# Patient Record
Sex: MALE | Race: WHITE | Employment: UNEMPLOYED | URBAN - METROPOLITAN AREA
[De-identification: names, ages, dates, MRNs, and addresses within clinical notes are randomized per-mention and may not be internally consistent; named-entity substitution may affect disease eponyms.]

---

## 2017-01-10 PROCEDURE — 82607 VITAMIN B-12: CPT | Performed by: INTERNAL MEDICINE

## 2017-02-15 RX ORDER — HYOSCYAMINE SULFATE 0.125 MG
0.12 TABLET,DISINTEGRATING ORAL EVERY 4 HOURS PRN
COMMUNITY
End: 2017-04-27

## 2017-02-17 ENCOUNTER — APPOINTMENT (OUTPATIENT)
Dept: LAB | Facility: HOSPITAL | Age: 52
End: 2017-02-17
Payer: COMMERCIAL

## 2017-02-17 DIAGNOSIS — R13.10 DYSPHAGIA: ICD-10-CM

## 2017-02-17 DIAGNOSIS — K22.0 ACHALASIA: ICD-10-CM

## 2017-02-17 LAB
ATRIAL RATE: 58 BPM
P AXIS: 119 DEGREES
P-R INTERVAL: 148 MS
Q-T INTERVAL: 412 MS
QRS DURATION: 104 MS
QTC CALCULATION (BEZET): 404 MS
R AXIS: 96 DEGREES
T AXIS: 109 DEGREES
VENTRICULAR RATE: 58 BPM

## 2017-02-17 PROCEDURE — 93005 ELECTROCARDIOGRAM TRACING: CPT

## 2017-02-17 PROCEDURE — 93010 ELECTROCARDIOGRAM REPORT: CPT | Performed by: INTERNAL MEDICINE

## 2017-02-20 ENCOUNTER — ANESTHESIA (OUTPATIENT)
Dept: ENDOSCOPY | Facility: HOSPITAL | Age: 52
End: 2017-02-20

## 2017-02-20 ENCOUNTER — HOSPITAL ENCOUNTER (OUTPATIENT)
Facility: HOSPITAL | Age: 52
Setting detail: HOSPITAL OUTPATIENT SURGERY
Discharge: HOME | End: 2017-02-20
Attending: INTERNAL MEDICINE | Admitting: INTERNAL MEDICINE
Payer: COMMERCIAL

## 2017-02-20 ENCOUNTER — ANESTHESIA EVENT (OUTPATIENT)
Dept: ENDOSCOPY | Facility: HOSPITAL | Age: 52
End: 2017-02-20

## 2017-02-20 ENCOUNTER — SURGERY (OUTPATIENT)
Age: 52
End: 2017-02-20

## 2017-02-20 VITALS
HEIGHT: 66 IN | SYSTOLIC BLOOD PRESSURE: 105 MMHG | BODY MASS INDEX: 20.09 KG/M2 | OXYGEN SATURATION: 100 % | DIASTOLIC BLOOD PRESSURE: 79 MMHG | TEMPERATURE: 98 F | WEIGHT: 125 LBS | HEART RATE: 64 BPM | RESPIRATION RATE: 18 BRPM

## 2017-02-20 DIAGNOSIS — K22.0 ACHALASIA: ICD-10-CM

## 2017-02-20 DIAGNOSIS — R13.10 DYSPHAGIA: Primary | ICD-10-CM

## 2017-02-20 DIAGNOSIS — R13.10 DYSPHAGIA, UNSPECIFIED TYPE: ICD-10-CM

## 2017-02-20 PROCEDURE — 0DJ08ZZ INSPECTION OF UPPER INTESTINAL TRACT, VIA NATURAL OR ARTIFICIAL OPENING ENDOSCOPIC: ICD-10-PCS | Performed by: INTERNAL MEDICINE

## 2017-02-20 PROCEDURE — 3E0G8GC INTRODUCTION OF OTHER THERAPEUTIC SUBSTANCE INTO UPPER GI, VIA NATURAL OR ARTIFICIAL OPENING ENDOSCOPIC: ICD-10-PCS | Performed by: INTERNAL MEDICINE

## 2017-02-20 RX ORDER — HYDROMORPHONE HYDROCHLORIDE 1 MG/ML
0.4 INJECTION, SOLUTION INTRAMUSCULAR; INTRAVENOUS; SUBCUTANEOUS EVERY 5 MIN PRN
Status: DISCONTINUED | OUTPATIENT
Start: 2017-02-20 | End: 2017-02-20

## 2017-02-20 RX ORDER — SODIUM CHLORIDE, SODIUM LACTATE, POTASSIUM CHLORIDE, CALCIUM CHLORIDE 600; 310; 30; 20 MG/100ML; MG/100ML; MG/100ML; MG/100ML
INJECTION, SOLUTION INTRAVENOUS CONTINUOUS
Status: DISCONTINUED | OUTPATIENT
Start: 2017-02-20 | End: 2017-02-20

## 2017-02-20 RX ORDER — ONDANSETRON 2 MG/ML
4 INJECTION INTRAMUSCULAR; INTRAVENOUS AS NEEDED
Status: DISCONTINUED | OUTPATIENT
Start: 2017-02-20 | End: 2017-02-20

## 2017-02-20 RX ORDER — METOCLOPRAMIDE HYDROCHLORIDE 5 MG/ML
10 INJECTION INTRAMUSCULAR; INTRAVENOUS AS NEEDED
Status: DISCONTINUED | OUTPATIENT
Start: 2017-02-20 | End: 2017-02-20

## 2017-02-20 RX ORDER — NALOXONE HYDROCHLORIDE 0.4 MG/ML
80 INJECTION, SOLUTION INTRAMUSCULAR; INTRAVENOUS; SUBCUTANEOUS AS NEEDED
Status: DISCONTINUED | OUTPATIENT
Start: 2017-02-20 | End: 2017-02-20

## 2017-02-20 NOTE — ANESTHESIA POSTPROCEDURE EVALUATION
[address] Patient Status:  Hospital Outpatient Surgery   Age/Gender 45year old male MRN [id number]   Location [facility]. Attending Jimenez Cheek MD   Hosp Day # 0 PCP Addis Catalan MD       Anesthesia Post-op

## 2017-02-20 NOTE — OPERATIVE REPORT
PATIENT NAME: Marquis De Leon  MRN: [id number]  DATE OF OPERATION: 2/20/17  REFERRING PHYSICIAN: Dr. Ailyn Alvarez  Medications: MAC  PREOPERATIVE DIAGNOSIS  Dysphagia  Achalasia  POSTOPERATIVE DIAGNOSIS:  1. Retained saliva and food particles in the esophagus  2. the procedure well. RECOMMENDATIONS   1. The patient will be provided with a written summary of today's endoscopic findings  2. The patient will be notified with biopsy results in 1 - 2 weeks. 3. Repeat egd with MAC with botox injection in 6 months.   A

## 2017-02-20 NOTE — H&P
H and P Update    The H and P by Dr. Heather Garvey, dated 2/15/17, has no changes.     GENERAL: well developed, well nourished, in no apparent distress  SKIN: no rashes, no suspicious lesions  HEENT: atraumatic, normocephalic, ears an

## 2017-02-20 NOTE — ANESTHESIA PREPROCEDURE EVALUATION
PRE-OP EVALUATION    Patient Name: Aj Cantrell    Pre-op Diagnosis: Achalasia [K22.0]  Dysphagia, unspecified type [R13.10]    Procedure(s):  ESOPHAGOGASTRODUODENOSCOPY WITH  INJECTION OF TWO HUNDRED UNITS BOTOX INTO ESOPHAGUS                   Surgeon Colon cancer: s/p colectomy. Cardiovascular        Exercise tolerance: good           (+) hypertension and well controlled                                    Endo/Other    Negative endo/other ROS.                               Pulmonary  [address] NDL/CATH SPI DX/THER NJX N/A 3/11/2015    Comment Procedure: LUMBAR EPIDURAL;  Surgeon: Paul Ortiz MD;  Location: [facility] FOR PAIN MANAGEMENT    INJECTION; SINGLE/MULTIPLE TRIGGER POINT(S), 1/2 MUS 01/10/2017   * 01/10/2017   CA 9.2 01/10/2017            Airway      Mallampati: II  Mouth opening: >3 FB  TM distance: > 6 cm  Neck ROM: full Cardiovascular      Rhythm: regular  Rate: normal  (-) murmur   Dental  Comment: Dentition is grossly [place]

## 2017-04-21 PROCEDURE — 86141 C-REACTIVE PROTEIN HS: CPT | Performed by: INTERNAL MEDICINE

## 2017-05-16 ENCOUNTER — APPOINTMENT (OUTPATIENT)
Dept: LAB | Age: 52
End: 2017-05-16
Attending: INTERNAL MEDICINE
Payer: COMMERCIAL

## 2017-05-16 DIAGNOSIS — G89.4 CHRONIC PAIN SYNDROME: ICD-10-CM

## 2017-05-16 DIAGNOSIS — R79.82 ELEVATED C-REACTIVE PROTEIN: ICD-10-CM

## 2017-05-16 PROCEDURE — 85652 RBC SED RATE AUTOMATED: CPT

## 2017-05-16 PROCEDURE — 36415 COLL VENOUS BLD VENIPUNCTURE: CPT

## 2017-05-16 PROCEDURE — 86140 C-REACTIVE PROTEIN: CPT

## 2017-05-31 PROBLEM — M51.36 DDD (DEGENERATIVE DISC DISEASE), LUMBAR: Status: ACTIVE | Noted: 2017-05-31

## 2017-06-15 PROBLEM — Z98.890 H/O LUMBOSACRAL SPINE SURGERY: Status: ACTIVE | Noted: 2017-06-15

## 2017-08-08 PROBLEM — M54.2 CERVICAL PAIN: Status: ACTIVE | Noted: 2017-08-08

## 2017-08-08 PROBLEM — G89.29 CHRONIC RIGHT-SIDED THORACIC BACK PAIN: Status: ACTIVE | Noted: 2017-08-08

## 2017-08-08 PROBLEM — M54.6 CHRONIC RIGHT-SIDED THORACIC BACK PAIN: Status: ACTIVE | Noted: 2017-08-08

## 2017-08-08 PROBLEM — M54.2 NECK PAIN: Status: ACTIVE | Noted: 2017-08-08

## 2017-08-30 PROBLEM — M48.02 FORAMINAL STENOSIS OF CERVICAL REGION: Status: ACTIVE | Noted: 2017-08-30

## 2017-10-14 ENCOUNTER — APPOINTMENT (OUTPATIENT)
Dept: LAB | Facility: HOSPITAL | Age: 52
End: 2017-10-14
Payer: COMMERCIAL

## 2017-10-14 DIAGNOSIS — R10.12 LEFT UPPER QUADRANT PAIN: ICD-10-CM

## 2017-10-14 PROCEDURE — 93005 ELECTROCARDIOGRAM TRACING: CPT

## 2017-10-14 PROCEDURE — 93010 ELECTROCARDIOGRAM REPORT: CPT | Performed by: INTERNAL MEDICINE

## 2017-10-26 ENCOUNTER — ANESTHESIA EVENT (OUTPATIENT)
Dept: ENDOSCOPY | Facility: HOSPITAL | Age: 52
End: 2017-10-26
Payer: COMMERCIAL

## 2017-10-27 ENCOUNTER — HOSPITAL ENCOUNTER (OUTPATIENT)
Facility: HOSPITAL | Age: 52
Setting detail: HOSPITAL OUTPATIENT SURGERY
Discharge: HOME | End: 2017-10-27
Attending: INTERNAL MEDICINE | Admitting: INTERNAL MEDICINE
Payer: COMMERCIAL

## 2017-10-27 ENCOUNTER — ANESTHESIA (OUTPATIENT)
Dept: ENDOSCOPY | Facility: HOSPITAL | Age: 52
End: 2017-10-27
Payer: COMMERCIAL

## 2017-10-27 ENCOUNTER — SURGERY (OUTPATIENT)
Age: 52
End: 2017-10-27

## 2017-10-27 VITALS
HEIGHT: 66 IN | TEMPERATURE: 98 F | OXYGEN SATURATION: 96 % | HEART RATE: 61 BPM | SYSTOLIC BLOOD PRESSURE: 107 MMHG | BODY MASS INDEX: 19.29 KG/M2 | DIASTOLIC BLOOD PRESSURE: 70 MMHG | RESPIRATION RATE: 16 BRPM | WEIGHT: 120 LBS

## 2017-10-27 DIAGNOSIS — R10.12 LEFT UPPER QUADRANT PAIN: Primary | ICD-10-CM

## 2017-10-27 PROCEDURE — 3E0G8GC INTRODUCTION OF OTHER THERAPEUTIC SUBSTANCE INTO UPPER GI, VIA NATURAL OR ARTIFICIAL OPENING ENDOSCOPIC: ICD-10-PCS | Performed by: INTERNAL MEDICINE

## 2017-10-27 RX ORDER — SODIUM CHLORIDE, SODIUM LACTATE, POTASSIUM CHLORIDE, CALCIUM CHLORIDE 600; 310; 30; 20 MG/100ML; MG/100ML; MG/100ML; MG/100ML
INJECTION, SOLUTION INTRAVENOUS CONTINUOUS
Status: DISCONTINUED | OUTPATIENT
Start: 2017-10-27 | End: 2017-10-27

## 2017-10-27 RX ORDER — DEXAMETHASONE SODIUM PHOSPHATE 4 MG/ML
4 VIAL (ML) INJECTION AS NEEDED
Status: DISCONTINUED | OUTPATIENT
Start: 2017-10-27 | End: 2017-10-27

## 2017-10-27 RX ORDER — ONDANSETRON 2 MG/ML
4 INJECTION INTRAMUSCULAR; INTRAVENOUS AS NEEDED
Status: DISCONTINUED | OUTPATIENT
Start: 2017-10-27 | End: 2017-10-27

## 2017-10-27 RX ORDER — NALOXONE HYDROCHLORIDE 0.4 MG/ML
80 INJECTION, SOLUTION INTRAMUSCULAR; INTRAVENOUS; SUBCUTANEOUS AS NEEDED
Status: DISCONTINUED | OUTPATIENT
Start: 2017-10-27 | End: 2017-10-27

## 2017-10-27 RX ORDER — HYDROMORPHONE HYDROCHLORIDE 1 MG/ML
0.4 INJECTION, SOLUTION INTRAMUSCULAR; INTRAVENOUS; SUBCUTANEOUS EVERY 5 MIN PRN
Status: DISCONTINUED | OUTPATIENT
Start: 2017-10-27 | End: 2017-10-27

## 2017-10-27 NOTE — ANESTHESIA POSTPROCEDURE EVALUATION
[address] Patient Status:  Hospital Outpatient Surgery   Age/Gender 45year old male MRN [id number]   Location 118 [facility]. Attending Fadi Bailey MD   Hosp Day # 0 PCP Jose C Ibarra MD       Anesthesia Post-op

## 2017-10-27 NOTE — ANESTHESIA PREPROCEDURE EVALUATION
PRE-OP EVALUATION    Patient Name: Danilo Keysster    Pre-op Diagnosis: Left upper quadrant pain [R10.12]    Procedure(s):  ESOPHAGOGASTRODUODENOSCOPY AND INJECTION OF 2000 INTERNATIONAL UNITS OF BOTOX IN ESOPHAGUS    Surgeon(s) and Role:     * Gigi Aranda allergies indicates no known allergies. Anesthesia Evaluation    Patient summary reviewed.     Anesthetic Complications  (-) history of anesthetic complications         GI/Hepatic/Renal  Comment: Achalasia s/p multiple EGD/botox inj MANAGEMENT  7/09: OTHER SURGICAL HISTORY      Comment: egd with botox injection into GE junction  No date: OTHER SURGICAL HISTORY      Comment: colectomy  6/2012: OTHER SURGICAL HISTORY      Comment: egd - botox injection   11/2012: OTHER SURGICAL HISTORY Airway      Mallampati: II    TM distance: 4 - 6 cm  Neck ROM: full Cardiovascular    Cardiovascular exam normal.  Rhythm: regular  Rate: normal  (-) murmur   Dental    No notable dental history.          Pulmonary    Pulmonary exam normal.  Breath so

## 2017-10-27 NOTE — H&P
Gastroenterology H and P  By Dr. Declan Doyle  CC: Dysphagia and achalasia    HPI:  Alexandria Lauren is a 45year old male. The pt has achalasia and recurrent dysphagia - has needed repeat botox injections every 6 - 9 months.       Allergy: No Known Allergie Diagnosis Date   • Achalasia 1997    achalasia - s/p heller myotomy with revision in 2004   • Anxiety state, unspecified    • Cataract     right eye   • Chronic pain syndrome    • Depressive disorder, not elsewhere classified    • Diverticulosis of colon PAIN MANAGEMENT  7/09: OTHER SURGICAL HISTORY      Comment: egd with botox injection into GE junction  No date: OTHER SURGICAL HISTORY      Comment: colectomy  6/2012: OTHER SURGICAL HISTORY      Comment: egd - botox injection   11/2012: OTHER SURGICAL HIS exertion  CARDIOVASCULAR: denies chest pain on exertion  GI: as above  : denies nocturia or changes in stream  MUSCULOSKELETAL: denies back pain  NEURO: denies headaches  PSYCHE: denies depression or anxiety  HEMATOLOGIC: denies hx of anemia  ENDOCRINE:

## 2017-10-27 NOTE — OPERATIVE REPORT
PATIENT NAME: Ирина Lazar  MRN: [id number]  DATE OF OPERATION: 10/27/2017  REFERRING PHYSICIAN: Dr. Daisy Pavon  Medications:  MAC  PREOPERATIVE DIAGNOSIS    Achalasia and dysphagia  Dysphagia  Achalasia  POSTOPERATIVE DIAGNOSIS:  1.  Retained saliva and food remarkable for normal mucosa. The entire examined duodenum was normal to the third portion. The gastroscope was removed from the patient. The procedure was completed. The patient tolerated the procedure well. RECOMMENDATIONS   1.   The patient will be pr

## 2018-06-25 PROBLEM — Z72.0 TOBACCO USE: Status: ACTIVE | Noted: 2018-06-25

## 2018-07-07 ENCOUNTER — HOSPITAL ENCOUNTER (OUTPATIENT)
Age: 53
Discharge: HOME | End: 2018-07-07
Payer: COMMERCIAL

## 2018-07-07 VITALS
DIASTOLIC BLOOD PRESSURE: 66 MMHG | SYSTOLIC BLOOD PRESSURE: 115 MMHG | HEART RATE: 65 BPM | TEMPERATURE: 99 F | OXYGEN SATURATION: 98 % | RESPIRATION RATE: 18 BRPM

## 2018-07-07 DIAGNOSIS — H10.31 ACUTE CONJUNCTIVITIS OF RIGHT EYE, UNSPECIFIED ACUTE CONJUNCTIVITIS TYPE: Primary | ICD-10-CM

## 2018-07-07 PROCEDURE — 99213 OFFICE O/P EST LOW 20 MIN: CPT

## 2018-07-07 PROCEDURE — 99203 OFFICE O/P NEW LOW 30 MIN: CPT

## 2018-07-07 RX ORDER — POLYMYXIN B SULFATE AND TRIMETHOPRIM 1; 10000 MG/ML; [USP'U]/ML
1 SOLUTION OPHTHALMIC EVERY 6 HOURS
Qty: 10 ML | Refills: 0 | Status: SHIPPED | OUTPATIENT
Start: 2018-07-07 | End: 2018-07-11

## 2018-07-07 NOTE — ED INITIAL ASSESSMENT (HPI)
Pt here with c/o pain/burning to right eye since this morning. He also reports blurry vision and redness.

## 2018-07-07 NOTE — ED PROVIDER NOTES
Patient Seen in: [facility]    History   Patient presents with:   Eye Visual Problem (opthalmic)    Stated Complaint: burning and pain in r eye    HPI    CHIEF COMPLAINT: Right eye irritation, burning    HISTORY OF PRESENT ILLNES generalized or localized, unspecified site     back and hands   • Perforation of small intestine ([address]    • Problems with swallowing     Achalasia   • Unspecified essential hypertension    • Visual impairment     reading glasses       Past Surgical History DOCUMENTED NOT TO HAVE EXPERIENCED ANY* N/A      Comment: Procedure: LUMBAR EPIDURAL;  Surgeon:                Marylee Parry, MD;  Location: [place] [address] MANAGEMENT  3/11/2015: PATIENT [facility] PREOPERATIVE ORDER FOR IV ANT* N/A no evidence of laceration or stye. The upper and lower lids were everted and there was no foreign body identified. Pupils are equal round and reactive to light and accomadation. No evidence of scleral icterus, subconjunctival hemorrhage or abrasion.  Rufina Amadael [id number]  [phone]    In 5 days  For reevaluation        Medications Prescribed:  Current Discharge Medication List    START taking these medications    Polymyxin B-Trimethoprim 74952-2.1 UNIT/ML-% Ophthalmic Solution  Place 1 drop into the right eye

## 2018-07-26 PROBLEM — M43.12 SPONDYLOLISTHESIS OF CERVICAL REGION: Status: ACTIVE | Noted: 2018-07-26

## 2018-09-26 ENCOUNTER — HOSPITAL ENCOUNTER (EMERGENCY)
Facility: HOSPITAL | Age: 53
Discharge: HOME | End: 2018-09-26
Attending: EMERGENCY MEDICINE
Payer: COMMERCIAL

## 2018-09-26 ENCOUNTER — APPOINTMENT (OUTPATIENT)
Dept: CT IMAGING | Facility: HOSPITAL | Age: 53
End: 2018-09-26
Attending: NURSE PRACTITIONER
Payer: COMMERCIAL

## 2018-09-26 VITALS
WEIGHT: 120 LBS | HEIGHT: 66 IN | OXYGEN SATURATION: 96 % | HEART RATE: 55 BPM | SYSTOLIC BLOOD PRESSURE: 110 MMHG | TEMPERATURE: 98 F | BODY MASS INDEX: 19.29 KG/M2 | RESPIRATION RATE: 18 BRPM | DIASTOLIC BLOOD PRESSURE: 71 MMHG

## 2018-09-26 DIAGNOSIS — K52.9 ACUTE COLITIS: Primary | ICD-10-CM

## 2018-09-26 PROCEDURE — 85025 COMPLETE CBC W/AUTO DIFF WBC: CPT | Performed by: NURSE PRACTITIONER

## 2018-09-26 PROCEDURE — 83690 ASSAY OF LIPASE: CPT | Performed by: NURSE PRACTITIONER

## 2018-09-26 PROCEDURE — 74177 CT ABD & PELVIS W/CONTRAST: CPT | Performed by: NURSE PRACTITIONER

## 2018-09-26 PROCEDURE — 96361 HYDRATE IV INFUSION ADD-ON: CPT

## 2018-09-26 PROCEDURE — 80053 COMPREHEN METABOLIC PANEL: CPT | Performed by: NURSE PRACTITIONER

## 2018-09-26 PROCEDURE — 81003 URINALYSIS AUTO W/O SCOPE: CPT | Performed by: NURSE PRACTITIONER

## 2018-09-26 PROCEDURE — 99284 EMERGENCY DEPT VISIT MOD MDM: CPT

## 2018-09-26 PROCEDURE — 96374 THER/PROPH/DIAG INJ IV PUSH: CPT

## 2018-09-26 RX ORDER — CIPROFLOXACIN 500 MG/1
500 TABLET, FILM COATED ORAL 2 TIMES DAILY
Qty: 14 TABLET | Refills: 0 | Status: SHIPPED | OUTPATIENT
Start: 2018-09-26 | End: 2018-10-03

## 2018-09-26 RX ORDER — METRONIDAZOLE 500 MG/1
500 TABLET ORAL 3 TIMES DAILY
Qty: 30 TABLET | Refills: 0 | Status: SHIPPED | OUTPATIENT
Start: 2018-09-26 | End: 2018-10-06

## 2018-09-26 RX ORDER — ONDANSETRON 4 MG/1
4 TABLET, ORALLY DISINTEGRATING ORAL EVERY 4 HOURS PRN
Qty: 20 TABLET | Refills: 0 | Status: SHIPPED | OUTPATIENT
Start: 2018-09-26 | End: 2018-10-03 | Stop reason: WASHOUT

## 2018-09-26 RX ORDER — HALOPERIDOL 5 MG/ML
5 INJECTION INTRAMUSCULAR ONCE
Status: COMPLETED | OUTPATIENT
Start: 2018-09-26 | End: 2018-09-26

## 2018-09-26 NOTE — ED INITIAL ASSESSMENT (HPI)
Patient arrives with c/o severe abdominal pain and spasms related to his achalasia. Patient states he has been unable to eat or drink, having dry heaving.

## 2018-09-27 NOTE — ED PROVIDER NOTES
Patient Seen in: [facility] Emergency Department    History   Patient presents with:  Abdomen/Flank Pain (GI/)    Stated Complaint: abd pain    57-year-old male presents today with complaints of abdominal cramping and nausea vomiting.   Has had dec Comment:  Performed by Paul Ortiz MD at [place]  8/10/2018: CERVICAL EPIDURAL; N/A      Comment:  Performed by Paul Ortiz MD at [place]  9/6/2017: CERVICAL EPIDURAL; N/ MANAGEMENT  10/31/2013: GASTROSCOPY; N/A      Comment:  Performed by Morro Rice MD at [facility] ENDOSCOPY  3/11/2015: INJECTION, W/WO CONTRAST, DX/THERAPEUTIC SUBSTANCE,   EPIDURAL/SUBARACHNOID; LUMBAR/SACRAL; N/A      Comment:  Procedure: LUMBAR EPIDURAL; PAIN MANAGEMENT  7/09: OTHER SURGICAL HISTORY      Comment:  egd with botox injection into GE junction  No date: OTHER SURGICAL HISTORY      Comment:  colectomy  6/2012: OTHER SURGICAL HISTORY      Comment:  egd - botox injection  11/2012: OTHER SURGICAL H HPI.  Constitutional and vital signs reviewed. All other systems reviewed and negative except as noted above.     Physical Exam     ED Triage Vitals [09/26/18 1838]   /73   Pulse 62   Resp 18   Temp 98.3 °F (36.8 °C)   Temp src Temporal   SpO2 10 Normal   CBC WITH DIFFERENTIAL WITH PLATELET    Narrative: The following orders were created for panel order CBC WITH DIFFERENTIAL WITH PLATELET.   Procedure                               Abnormality         Status                     --------- and descending colon. Small bowel loops show no evidence of obstruction.   Diffuse peritoneal calcifications are seen which can be seen in patients with previous peritonitis with some adjacent fluid within the pelvis, previous sigmoid anastomosis changes a next week. Discussed results and plan of care with patient who verbalized understanding and agree with plan of care.           Disposition and Plan     Clinical Impression:  Acute colitis  (primary encounter diagnosis)    Disposition:  Discharge  9/26/2018

## 2018-10-02 ENCOUNTER — APPOINTMENT (OUTPATIENT)
Dept: CT IMAGING | Facility: HOSPITAL | Age: 53
End: 2018-10-02
Attending: EMERGENCY MEDICINE
Payer: COMMERCIAL

## 2018-10-02 ENCOUNTER — HOSPITAL ENCOUNTER (EMERGENCY)
Facility: HOSPITAL | Age: 53
Discharge: HOME | End: 2018-10-02
Attending: EMERGENCY MEDICINE
Payer: COMMERCIAL

## 2018-10-02 VITALS
HEIGHT: 66 IN | RESPIRATION RATE: 14 BRPM | DIASTOLIC BLOOD PRESSURE: 62 MMHG | WEIGHT: 119.94 LBS | BODY MASS INDEX: 19.27 KG/M2 | HEART RATE: 69 BPM | OXYGEN SATURATION: 98 % | SYSTOLIC BLOOD PRESSURE: 102 MMHG | TEMPERATURE: 98 F

## 2018-10-02 DIAGNOSIS — R10.84 ABDOMINAL PAIN, GENERALIZED: Primary | ICD-10-CM

## 2018-10-02 PROCEDURE — 83690 ASSAY OF LIPASE: CPT | Performed by: EMERGENCY MEDICINE

## 2018-10-02 PROCEDURE — 96361 HYDRATE IV INFUSION ADD-ON: CPT | Performed by: EMERGENCY MEDICINE

## 2018-10-02 PROCEDURE — 99285 EMERGENCY DEPT VISIT HI MDM: CPT | Performed by: EMERGENCY MEDICINE

## 2018-10-02 PROCEDURE — 96374 THER/PROPH/DIAG INJ IV PUSH: CPT | Performed by: EMERGENCY MEDICINE

## 2018-10-02 PROCEDURE — 85025 COMPLETE CBC W/AUTO DIFF WBC: CPT | Performed by: EMERGENCY MEDICINE

## 2018-10-02 PROCEDURE — 80053 COMPREHEN METABOLIC PANEL: CPT | Performed by: EMERGENCY MEDICINE

## 2018-10-02 PROCEDURE — 96375 TX/PRO/DX INJ NEW DRUG ADDON: CPT | Performed by: EMERGENCY MEDICINE

## 2018-10-02 PROCEDURE — 74177 CT ABD & PELVIS W/CONTRAST: CPT | Performed by: EMERGENCY MEDICINE

## 2018-10-02 RX ORDER — MORPHINE SULFATE 4 MG/ML
4 INJECTION, SOLUTION INTRAMUSCULAR; INTRAVENOUS ONCE
Status: COMPLETED | OUTPATIENT
Start: 2018-10-02 | End: 2018-10-02

## 2018-10-02 RX ORDER — HALOPERIDOL 5 MG/ML
5 INJECTION INTRAMUSCULAR ONCE
Status: COMPLETED | OUTPATIENT
Start: 2018-10-02 | End: 2018-10-02

## 2018-10-02 RX ORDER — LORAZEPAM 2 MG/ML
1 INJECTION INTRAMUSCULAR ONCE
Status: COMPLETED | OUTPATIENT
Start: 2018-10-02 | End: 2018-10-02

## 2018-10-02 NOTE — ED PROVIDER NOTES
Patient Seen in: [facility] Emergency Department    History   Patient presents with:  Abdomen/Flank Pain (GI/)    Stated Complaint: abd pain    HPI    52-year-old with a history of achalasia status post Heller knee with revision in 2004 who is jonh History:  9/14/2018: CERVICAL EPIDURAL; N/A      Comment:  Performed by Susan Crespo MD at [place]  8/27/2018: CERVICAL EPIDURAL; N/A      Comment:  Performed by Susan Crespo MD at [address] [place] [address] FLUOR GID & Maricel Dates NDL/CATH SPI DX/THER NJX; N/A      Comment:  Procedure: LUMBAR EPIDURAL;  Surgeon: Miakel Chávez MD;  Location: [address] [place] [address] MANAGEMENT  10/31/2013: GASTROSCOPY; N/A      Comment:  Performed by Mery Gallardo, MANAGEMENT  3/11/2015: M-BOBBY BY ROSS BAJWA [facility] 5+ YR; N/A      Comment:  Procedure: LUMBAR EPIDURAL;  Surgeon: Clau Crowley MD;  Location: [address] [place] [address] MANAGEMENT  7/09: OTHER SURGICAL HISTORY      Comment:  egd with botox 7.0 times per week      Types: Cannabis      Comment: marijuana daily      Review of Systems    Positive for stated complaint: abd pain  Other systems are as noted in HPI. Constitutional and vital signs reviewed.       All other systems reviewed and negati Status                     ---------                               -----------         ------                     CBC W/ DIFFERENTIAL[[id number]]          Abnormal            Final result                 Please view results for these tests on the individua

## 2018-10-03 ENCOUNTER — HOSPITAL ENCOUNTER (OUTPATIENT)
Facility: HOSPITAL | Age: 53
Discharge: HOME | End: 2018-10-03
Attending: INTERNAL MEDICINE | Admitting: INTERNAL MEDICINE
Payer: COMMERCIAL

## 2018-10-03 VITALS
WEIGHT: 120 LBS | RESPIRATION RATE: 20 BRPM | SYSTOLIC BLOOD PRESSURE: 122 MMHG | DIASTOLIC BLOOD PRESSURE: 81 MMHG | HEIGHT: 66 IN | TEMPERATURE: 98 F | OXYGEN SATURATION: 91 % | BODY MASS INDEX: 19.29 KG/M2 | HEART RATE: 69 BPM

## 2018-10-03 DIAGNOSIS — R13.19 ESOPHAGEAL DYSPHAGIA: ICD-10-CM

## 2018-10-03 DIAGNOSIS — K22.0 ACHALASIA: ICD-10-CM

## 2018-10-03 PROCEDURE — 3E0G8GC INTRODUCTION OF OTHER THERAPEUTIC SUBSTANCE INTO UPPER GI, VIA NATURAL OR ARTIFICIAL OPENING ENDOSCOPIC: ICD-10-PCS | Performed by: INTERNAL MEDICINE

## 2018-10-03 PROCEDURE — 88305 TISSUE EXAM BY PATHOLOGIST: CPT | Performed by: INTERNAL MEDICINE

## 2018-10-03 PROCEDURE — 88321 CONSLTJ&REPRT SLD PREP ELSWR: CPT | Performed by: INTERNAL MEDICINE

## 2018-10-03 PROCEDURE — 0DB58ZX EXCISION OF ESOPHAGUS, VIA NATURAL OR ARTIFICIAL OPENING ENDOSCOPIC, DIAGNOSTIC: ICD-10-PCS | Performed by: INTERNAL MEDICINE

## 2018-10-03 RX ORDER — SODIUM CHLORIDE, SODIUM LACTATE, POTASSIUM CHLORIDE, CALCIUM CHLORIDE 600; 310; 30; 20 MG/100ML; MG/100ML; MG/100ML; MG/100ML
INJECTION, SOLUTION INTRAVENOUS CONTINUOUS
Status: DISCONTINUED | OUTPATIENT
Start: 2018-10-03 | End: 2018-10-03

## 2018-10-03 NOTE — BRIEF OP NOTE
Pre-Operative Diagnosis: Esophageal dysphagia [R13.10]  Achalasia [K22.0]     Post-Operative Diagnosis: esophageal mass and achalasia   Procedure Performed:   Procedure(s):  ESOPHAGOGASTRODUODENOSCOPY with injection of Botox ge junction and forcep biopsy

## 2018-10-04 NOTE — H&P
H and P Update     The history obtained by Dr. Allison Vang, dated 9/20/18, has no changes.     GENERAL: well developed, well nourished, in no apparent distress  HEENT: atraumatic, normocephalic, ears and throat are clear  NECK: supple,

## 2018-10-12 NOTE — OPERATIVE REPORT
PATIENT NAME: Donna Dubon  MRN: [id number]  DATE OF OPERATION: 10/3/18  REFERRING PHYSICIAN: Dr. Zach Tavarez  Medications:  MAC  PREOPERATIVE DIAGNOSIS             Achalasia and dysphagia  POSTOPERATIVE DIAGNOSIS:  1. 7 cm mass extending from 31 to 38 cm fr biopsy results. 4. Repeat egd with botox in 9 months.   Loc Trevizo MD, Gastroenterologist  Cc: Dr. Zach Tavarez

## 2018-10-29 PROBLEM — C15.8 MALIGNANT NEOPLASM OF OVERLAPPING SITES OF ESOPHAGUS (HCC): Status: ACTIVE | Noted: 2018-10-29

## 2018-11-01 PROBLEM — C15.9 SQUAMOUS CELL CARCINOMA OF ESOPHAGUS (HCC): Status: ACTIVE | Noted: 2018-11-01

## 2018-11-02 PROBLEM — C15.5 MALIGNANT NEOPLASM OF LOWER THIRD OF ESOPHAGUS (HCC): Status: ACTIVE | Noted: 2018-11-02

## 2018-11-05 ENCOUNTER — ANESTHESIA (OUTPATIENT)
Dept: ENDOSCOPY | Facility: HOSPITAL | Age: 53
End: 2018-11-05
Payer: COMMERCIAL

## 2018-11-05 ENCOUNTER — HOSPITAL ENCOUNTER (OUTPATIENT)
Facility: HOSPITAL | Age: 53
Setting detail: HOSPITAL OUTPATIENT SURGERY
Discharge: HOME | End: 2018-11-05
Attending: INTERNAL MEDICINE | Admitting: INTERNAL MEDICINE
Payer: COMMERCIAL

## 2018-11-05 ENCOUNTER — ANESTHESIA EVENT (OUTPATIENT)
Dept: ENDOSCOPY | Facility: HOSPITAL | Age: 53
End: 2018-11-05
Payer: COMMERCIAL

## 2018-11-05 VITALS
HEIGHT: 66 IN | TEMPERATURE: 97 F | WEIGHT: 111 LBS | OXYGEN SATURATION: 99 % | SYSTOLIC BLOOD PRESSURE: 103 MMHG | HEART RATE: 64 BPM | DIASTOLIC BLOOD PRESSURE: 63 MMHG | RESPIRATION RATE: 18 BRPM | BODY MASS INDEX: 17.84 KG/M2

## 2018-11-05 DIAGNOSIS — C15.9 MALIGNANT NEOPLASM OF ESOPHAGUS, UNSPECIFIED LOCATION (HCC): ICD-10-CM

## 2018-11-05 PROCEDURE — 88305 TISSUE EXAM BY PATHOLOGIST: CPT | Performed by: INTERNAL MEDICINE

## 2018-11-05 PROCEDURE — 88321 CONSLTJ&REPRT SLD PREP ELSWR: CPT | Performed by: INTERNAL MEDICINE

## 2018-11-05 PROCEDURE — 88173 CYTOPATH EVAL FNA REPORT: CPT | Performed by: INTERNAL MEDICINE

## 2018-11-05 PROCEDURE — 07D78ZX EXTRACTION OF THORAX LYMPHATIC, VIA NATURAL OR ARTIFICIAL OPENING ENDOSCOPIC, DIAGNOSTIC: ICD-10-PCS | Performed by: INTERNAL MEDICINE

## 2018-11-05 RX ORDER — SODIUM CHLORIDE, SODIUM LACTATE, POTASSIUM CHLORIDE, CALCIUM CHLORIDE 600; 310; 30; 20 MG/100ML; MG/100ML; MG/100ML; MG/100ML
INJECTION, SOLUTION INTRAVENOUS CONTINUOUS
Status: DISCONTINUED | OUTPATIENT
Start: 2018-11-05 | End: 2018-11-13

## 2018-11-05 NOTE — ANESTHESIA POSTPROCEDURE EVALUATION
[address] Patient Status:  Hospital Outpatient Surgery   Age/Gender 52year old male MRN [id number]   Location [facility]. Attending Олег Valdez MD   Hosp Day # 0 PCP Luiza Peterson MD       Anesthesia Post-op

## 2018-11-05 NOTE — OPERATIVE REPORT
OPERATIVE REPORT   PATIENT NAME: Isa Rao  MRN: [id number]  DATE OF OPERATION: 11/5/2018  PREOPERATIVE DIAGNOSIS:   1. 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   He was referred for locoregional staging by endoscopic ultras scope across it without difficulty. 2. Normal stomach throughout with no evidence of ulcers, masses or other abnormalities. Retroflexion revealed a normal cardia and fundus. The antrum was normal and the pylorus was patent.   3. Normal duodenum to the seco

## 2018-11-05 NOTE — ANESTHESIA PREPROCEDURE EVALUATION
PRE-OP EVALUATION    Patient Name: Zee Tsang    Pre-op Diagnosis: Malignant neoplasm of es[facility], unspecified location ([facility] [C15.9]    Procedure(s):  ENDOSCOPIC ULTRASOUND , ESOPHAGOGASTRODUODENOSCOPY  with possible fine needle aspiration      Surg Pulmonary                           Neuro/Psych        (+) anxiety                            Past Surgical History:   Procedure Laterality Date   • CERVICAL EPIDURAL N/A 9/14/2018    Performed by Stephany Arenas MD at [facility] FLUOR GID & Kathryn Velasco NDL/CATH SPI DX/THER NJX N/A 3/11/2015    Procedure: LUMBAR EPIDURAL;  Surgeon: Shane Mooney MD;  Location: [address] [place] [address]   • GASTROSCOPY N/A 10/31/2013    Performed by Alejandra Restrepo MD at [facility] ENDOSCOPY   • INJECT egd - botox injection   • OTHER SURGICAL HISTORY  11/2012    egd - botox injection   • OTHER SURGICAL HISTORY  3/5/2013    EGD - botox injection   • OTHER SURGICAL HISTORY N/A 6/23/2016    Procedure: ESOPHAGOGASTRODUODENOSCOPY (EGD);   Surgeon: Grecia Maldonado Airway      Mallampati: I  Mouth opening: >3 FB  TM distance: > 6 cm  Neck ROM: full Cardiovascular    Cardiovascular exam normal.  Rhythm: regular  Rate: normal     Dental    No notable dental history.          Pulmonary    Pulmonary exam normal

## 2018-12-03 ENCOUNTER — HOSPITAL ENCOUNTER (INPATIENT)
Facility: HOSPITAL | Age: 53
LOS: 2 days | Discharge: HOME | DRG: 372 | End: 2018-12-05
Attending: EMERGENCY MEDICINE | Admitting: INTERNAL MEDICINE
Payer: COMMERCIAL

## 2018-12-03 ENCOUNTER — APPOINTMENT (OUTPATIENT)
Dept: CT IMAGING | Facility: HOSPITAL | Age: 53
DRG: 372 | End: 2018-12-03
Attending: EMERGENCY MEDICINE
Payer: COMMERCIAL

## 2018-12-03 DIAGNOSIS — R11.2 INTRACTABLE VOMITING WITH NAUSEA, UNSPECIFIED VOMITING TYPE: ICD-10-CM

## 2018-12-03 DIAGNOSIS — E87.1 HYPONATREMIA: Primary | ICD-10-CM

## 2018-12-03 DIAGNOSIS — R52 INTRACTABLE PAIN: ICD-10-CM

## 2018-12-03 PROBLEM — R73.9 HYPERGLYCEMIA: Status: ACTIVE | Noted: 2018-12-03

## 2018-12-03 PROBLEM — D64.9 ANEMIA: Status: ACTIVE | Noted: 2018-12-03

## 2018-12-03 PROCEDURE — 74177 CT ABD & PELVIS W/CONTRAST: CPT | Performed by: EMERGENCY MEDICINE

## 2018-12-03 RX ORDER — PANTOPRAZOLE SODIUM 40 MG/1
40 TABLET, DELAYED RELEASE ORAL 2 TIMES DAILY
COMMUNITY
End: 2019-09-20

## 2018-12-03 RX ORDER — KETAMINE HYDROCHLORIDE 50 MG/ML
0.2 INJECTION, SOLUTION, CONCENTRATE INTRAMUSCULAR; INTRAVENOUS ONCE
Status: COMPLETED | OUTPATIENT
Start: 2018-12-03 | End: 2018-12-03

## 2018-12-03 RX ORDER — BUPRENORPHINE HYDROCHLORIDE 8 MG/1
8 TABLET SUBLINGUAL EVERY 8 HOURS PRN
Status: DISCONTINUED | OUTPATIENT
Start: 2018-12-03 | End: 2018-12-04

## 2018-12-03 RX ORDER — ONDANSETRON 2 MG/ML
4 INJECTION INTRAMUSCULAR; INTRAVENOUS ONCE
Status: COMPLETED | OUTPATIENT
Start: 2018-12-03 | End: 2018-12-03

## 2018-12-03 RX ORDER — FLUTICASONE PROPIONATE 50 MCG
2 SPRAY, SUSPENSION (ML) NASAL DAILY
Status: DISCONTINUED | OUTPATIENT
Start: 2018-12-03 | End: 2018-12-05

## 2018-12-03 RX ORDER — DICYCLOMINE HYDROCHLORIDE 10 MG/1
10 CAPSULE ORAL EVERY 4 HOURS PRN
Status: DISCONTINUED | OUTPATIENT
Start: 2018-12-03 | End: 2018-12-05

## 2018-12-03 RX ORDER — CLOBETASOL PROPIONATE 0.5 MG/G
1 OINTMENT TOPICAL DAILY PRN
Refills: 4 | COMMUNITY
Start: 2018-11-15 | End: 2019-09-20

## 2018-12-03 RX ORDER — CLONAZEPAM 0.5 MG/1
0.5 TABLET ORAL 2 TIMES DAILY
Status: DISCONTINUED | OUTPATIENT
Start: 2018-12-03 | End: 2018-12-05

## 2018-12-03 RX ORDER — SODIUM CHLORIDE 9 MG/ML
INJECTION, SOLUTION INTRAVENOUS CONTINUOUS
Status: DISCONTINUED | OUTPATIENT
Start: 2018-12-03 | End: 2018-12-05

## 2018-12-03 RX ORDER — POTASSIUM CHLORIDE 14.9 MG/ML
20 INJECTION INTRAVENOUS ONCE
Status: DISCONTINUED | OUTPATIENT
Start: 2018-12-03 | End: 2018-12-03

## 2018-12-03 RX ORDER — MORPHINE SULFATE 4 MG/ML
1 INJECTION, SOLUTION INTRAMUSCULAR; INTRAVENOUS EVERY 2 HOUR PRN
Status: DISCONTINUED | OUTPATIENT
Start: 2018-12-03 | End: 2018-12-03

## 2018-12-03 RX ORDER — METOCLOPRAMIDE HYDROCHLORIDE 5 MG/ML
10 INJECTION INTRAMUSCULAR; INTRAVENOUS EVERY 8 HOURS PRN
Status: DISCONTINUED | OUTPATIENT
Start: 2018-12-03 | End: 2018-12-05

## 2018-12-03 RX ORDER — MORPHINE SULFATE 4 MG/ML
2 INJECTION, SOLUTION INTRAMUSCULAR; INTRAVENOUS EVERY 2 HOUR PRN
Status: CANCELLED | OUTPATIENT
Start: 2018-12-03

## 2018-12-03 RX ORDER — HYDROMORPHONE HYDROCHLORIDE 1 MG/ML
0.5 INJECTION, SOLUTION INTRAMUSCULAR; INTRAVENOUS; SUBCUTANEOUS EVERY 30 MIN PRN
Status: CANCELLED | OUTPATIENT
Start: 2018-12-03 | End: 2018-12-03

## 2018-12-03 RX ORDER — ACETAMINOPHEN 325 MG/1
650 TABLET ORAL EVERY 4 HOURS PRN
Status: DISCONTINUED | OUTPATIENT
Start: 2018-12-03 | End: 2018-12-05

## 2018-12-03 RX ORDER — CALCIPOTRIENE 50 UG/G
1 CREAM TOPICAL DAILY PRN
Refills: 4 | COMMUNITY
Start: 2018-11-15 | End: 2019-09-20

## 2018-12-03 RX ORDER — MORPHINE SULFATE 4 MG/ML
2 INJECTION, SOLUTION INTRAMUSCULAR; INTRAVENOUS EVERY 2 HOUR PRN
Status: DISCONTINUED | OUTPATIENT
Start: 2018-12-03 | End: 2018-12-03

## 2018-12-03 RX ORDER — SODIUM CHLORIDE 9 MG/ML
INJECTION, SOLUTION INTRAVENOUS CONTINUOUS
Status: CANCELLED | OUTPATIENT
Start: 2018-12-03 | End: 2018-12-03

## 2018-12-03 RX ORDER — HYDROCODONE BITARTRATE AND ACETAMINOPHEN 5; 325 MG/1; MG/1
2 TABLET ORAL EVERY 4 HOURS PRN
Status: DISCONTINUED | OUTPATIENT
Start: 2018-12-03 | End: 2018-12-03

## 2018-12-03 RX ORDER — ONDANSETRON 2 MG/ML
4 INJECTION INTRAMUSCULAR; INTRAVENOUS EVERY 6 HOURS PRN
Status: DISCONTINUED | OUTPATIENT
Start: 2018-12-03 | End: 2018-12-05

## 2018-12-03 RX ORDER — PREGABALIN 100 MG/1
100 CAPSULE ORAL 2 TIMES DAILY
Status: DISCONTINUED | OUTPATIENT
Start: 2018-12-03 | End: 2018-12-05

## 2018-12-03 RX ORDER — HYOSCYAMINE SULFATE 0.125 MG
0.12 TABLET,DISINTEGRATING ORAL EVERY 4 HOURS PRN
Status: DISCONTINUED | OUTPATIENT
Start: 2018-12-03 | End: 2018-12-05

## 2018-12-03 RX ORDER — DOCUSATE SODIUM 100 MG/1
100 CAPSULE, LIQUID FILLED ORAL 2 TIMES DAILY
Status: DISCONTINUED | OUTPATIENT
Start: 2018-12-03 | End: 2018-12-05

## 2018-12-03 RX ORDER — BISACODYL 10 MG
10 SUPPOSITORY, RECTAL RECTAL
Status: DISCONTINUED | OUTPATIENT
Start: 2018-12-03 | End: 2018-12-05

## 2018-12-03 RX ORDER — POLYETHYLENE GLYCOL 3350 17 G/17G
17 POWDER, FOR SOLUTION ORAL DAILY PRN
Status: DISCONTINUED | OUTPATIENT
Start: 2018-12-03 | End: 2018-12-05

## 2018-12-03 RX ORDER — FLUTICASONE PROPIONATE 50 MCG
2 SPRAY, SUSPENSION (ML) NASAL DAILY
COMMUNITY
End: 2019-01-25

## 2018-12-03 RX ORDER — MORPHINE SULFATE 4 MG/ML
4 INJECTION, SOLUTION INTRAMUSCULAR; INTRAVENOUS EVERY 2 HOUR PRN
Status: CANCELLED | OUTPATIENT
Start: 2018-12-03

## 2018-12-03 RX ORDER — MORPHINE SULFATE 4 MG/ML
4 INJECTION, SOLUTION INTRAMUSCULAR; INTRAVENOUS EVERY 2 HOUR PRN
Status: DISCONTINUED | OUTPATIENT
Start: 2018-12-03 | End: 2018-12-03

## 2018-12-03 RX ORDER — HALOPERIDOL 5 MG/ML
5 INJECTION INTRAMUSCULAR ONCE
Status: COMPLETED | OUTPATIENT
Start: 2018-12-03 | End: 2018-12-03

## 2018-12-03 RX ORDER — SODIUM PHOSPHATE, DIBASIC AND SODIUM PHOSPHATE, MONOBASIC 7; 19 G/133ML; G/133ML
1 ENEMA RECTAL ONCE AS NEEDED
Status: DISCONTINUED | OUTPATIENT
Start: 2018-12-03 | End: 2018-12-05

## 2018-12-03 RX ORDER — PANTOPRAZOLE SODIUM 40 MG/1
40 TABLET, DELAYED RELEASE ORAL 2 TIMES DAILY
Status: DISCONTINUED | OUTPATIENT
Start: 2018-12-03 | End: 2018-12-05

## 2018-12-03 RX ORDER — ACETAMINOPHEN 325 MG/1
650 TABLET ORAL EVERY 6 HOURS PRN
Status: DISCONTINUED | OUTPATIENT
Start: 2018-12-03 | End: 2018-12-05

## 2018-12-03 RX ORDER — ENOXAPARIN SODIUM 100 MG/ML
40 INJECTION SUBCUTANEOUS NIGHTLY
Status: DISCONTINUED | OUTPATIENT
Start: 2018-12-03 | End: 2018-12-05

## 2018-12-03 RX ORDER — RISPERIDONE 120 MG
1 KIT SUBCUTANEOUS 2 TIMES DAILY
Refills: 0 | COMMUNITY
Start: 2018-11-12 | End: 2018-12-06

## 2018-12-03 RX ORDER — HYDROCODONE BITARTRATE AND ACETAMINOPHEN 5; 325 MG/1; MG/1
1 TABLET ORAL EVERY 4 HOURS PRN
Status: DISCONTINUED | OUTPATIENT
Start: 2018-12-03 | End: 2018-12-03

## 2018-12-03 RX ORDER — LIDOCAINE 50 MG/G
1 OINTMENT TOPICAL DAILY PRN
Refills: 4 | COMMUNITY
Start: 2018-11-15 | End: 2019-02-18

## 2018-12-03 RX ORDER — MORPHINE SULFATE 4 MG/ML
4 INJECTION, SOLUTION INTRAMUSCULAR; INTRAVENOUS EVERY 30 MIN PRN
Status: DISCONTINUED | OUTPATIENT
Start: 2018-12-03 | End: 2018-12-03

## 2018-12-03 RX ORDER — ONDANSETRON 2 MG/ML
4 INJECTION INTRAMUSCULAR; INTRAVENOUS EVERY 4 HOURS PRN
Status: CANCELLED | OUTPATIENT
Start: 2018-12-03

## 2018-12-03 RX ORDER — POTASSIUM CHLORIDE 20 MEQ/1
40 TABLET, EXTENDED RELEASE ORAL ONCE
Status: COMPLETED | OUTPATIENT
Start: 2018-12-03 | End: 2018-12-03

## 2018-12-03 NOTE — ED NOTES
Patient remains updated with plan of care, waiting for transport to take him to room 406. Report has been called to [facility] for 406.

## 2018-12-03 NOTE — ED NOTES
Patient resting more comfortably on cart at this time, able to sleep intermittently. Belongings placed in bags by family member at bedside. Patient opens eyes to name and answers questions appropriately. VSS at this time. No distress observed.  Remains awar

## 2018-12-03 NOTE — ED NOTES
Patient requesting additional pain medication. MD informed. Aware of patient's BP, plan to order dose of ketamine as alternative to morphine.

## 2018-12-03 NOTE — ED NOTES
Attempted to give potassium. Patient started to cough following ingestion of first pill. Not safe to attempt ingestion of second pill as patient can not tolerate.

## 2018-12-03 NOTE — PROGRESS NOTES
NURSING ADMISSION NOTE      Patient admitted via Cart  Oriented to room. Safety precautions initiated. Bed in low position. Call light in reach.   Alert and oriented;stated i'm in pain;geven morphine 2 mg et time;after given he asked me if a gave it

## 2018-12-03 NOTE — ED NOTES
MD made aware of blood pressure and also that patient is refusing IV potassium, stating he is \"hooked up to too many things. \" Patient requesting PO potassium. Fluid bolus and PO potassium ordered.

## 2018-12-03 NOTE — ED NOTES
Received report from Ashia. Taking over patient care at this time. Patient sleeping on cot. No needs expressed. No apparent distress. Family is at bedside.

## 2018-12-03 NOTE — ED PROVIDER NOTES
Patient Seen in: [facility] Emergency Department    History   Patient presents with:  Nausea/Vomiting/Diarrhea (gastrointestinal)    Stated Complaint: abd pain, n/v/d    HPI    75-year-old with a history of achalasia, chronic back pain, depression, reading glasses       Past Surgical History:   Procedure Laterality Date   • CERVICAL EPIDURAL N/A 9/14/2018    Performed by Marylee Parry, MD at [address] N/A 8/27/2018    Performed by Marylee Parry, MD 10/31/2013    Performed by Matt Maciel MD at [address] [facility] N/A 11/13/2018    Performed by Dante Javed MD at [facility] N/A 6/4/2018    Performed by Tori Rasheed MD at [facility]) Smokeless tobacco: Never Used    Alcohol use: No    Drug use: Yes      Frequency: 7.0 times per week      Types: Cannabis      Comment: marijuana daily      Review of Systems    Positive for stated complaint: abd pain, n/v/d  Other systems are as noted in Abnormality         Status                     ---------                               -----------         ------                     CBC W/ DIFFERENTIAL[[id number]]          Abnormal            Final result                 Please v

## 2018-12-03 NOTE — ED NOTES
Patient taken to CT at this time by [facility]. Patient and family member remain updated with plan of care. Reports pain controlled at this time. CT screening form completed.

## 2018-12-03 NOTE — H&P
[facility] Hospitalist History and Physical      Patient presents with:  Nausea/Vomiting/Diarrhea (gastrointestinal)       PCP: Maria Isabel Tavares MD      History of Present Illness: Patient is a 52year old male with PMH sig for invasive SCC of espophagus on freddy FOR PAIN MANAGEMENT   • COLECTOMY  1990   • COLONOSCOPY  2004    normal sigmoid anastamosis   • COLONOSCOPY  11/2012    diverticulosis and sigmoid anastomosis   • ENDOSCOPIC ULTRASOUND (EUS) N/A 11/5/2018    Performed by Jessica Dan MD at [facility] ENDOSCOPY LUMBAR EPIDURAL N/A 6/15/2017    Performed by Doug Gold MD at [address] [place] [address] N/A 5/31/2017    Performed by Doug Gold MD at [address] [place] [address] N/A 5/16/2017    Performed by Sublingual SL Tab One sublingual q 8 hours prn pain   ClonazePAM 0.5 MG Oral Tab Take 1 tablet (0.5 mg total) by mouth 2 (two) times daily. pregabalin (LYRICA) 100 MG Oral Cap Take 1 capsule (100 mg total) by mouth 2 (two) times daily.    Lisinopril-Hydro No drainage.    Throat: Lips, mucosa, and tongue normal. Teeth and gums normal.   Neck: Supple, symmetrical, trachea midline, no cervical or supraclavicular lymph adenopathy, thyroid: no enlargment/tenderness/nodules appreciated   Lungs:   Clear to ausculta INDICATION: Esophageal cancer. COMPARISON: Whole-body PET CT 11/3/2018. TECHNIQUE: 2.5 mm thick unenhanced images were obtained through the chest. FINDINGS: The study is limited by its noncontrast nature.  The soft tissue mass in the mid and distal esophagu bifurcation. High-grade stenosis is noted involving the distal right common iliac artery with near complete occlusion. Moderate narrowing is noted on the left involving the distal left common iliac artery. RETROPERITONEUM:  No mass or adenopathy.   Slight esophagus  - onc consulted here    Ho achalasia  - swallowing ok    HTN  - hold meds for now    Hyponatremia  - from dehydration, IVF    Chronic anemia  - Hg near baseline    Lovenox    Outpatient records or previous hospital records reviewed.    DM hospit

## 2018-12-04 PROCEDURE — 90792 PSYCH DIAG EVAL W/MED SRVCS: CPT | Performed by: OTHER

## 2018-12-04 RX ORDER — MIRTAZAPINE 15 MG/1
15 TABLET, FILM COATED ORAL NIGHTLY
Status: DISCONTINUED | OUTPATIENT
Start: 2018-12-04 | End: 2018-12-05

## 2018-12-04 RX ORDER — SUCRALFATE ORAL 1 G/10ML
1 SUSPENSION ORAL
Status: DISCONTINUED | OUTPATIENT
Start: 2018-12-04 | End: 2018-12-05

## 2018-12-04 RX ORDER — BUPRENORPHINE HYDROCHLORIDE 8 MG/1
8 TABLET SUBLINGUAL ONCE
Status: COMPLETED | OUTPATIENT
Start: 2018-12-04 | End: 2018-12-04

## 2018-12-04 RX ORDER — BUPRENORPHINE HYDROCHLORIDE 8 MG/1
8 TABLET SUBLINGUAL EVERY 8 HOURS
Status: DISCONTINUED | OUTPATIENT
Start: 2018-12-04 | End: 2018-12-04

## 2018-12-04 RX ORDER — BUPRENORPHINE HYDROCHLORIDE 8 MG/1
8 TABLET SUBLINGUAL 3 TIMES DAILY
Status: DISCONTINUED | OUTPATIENT
Start: 2018-12-04 | End: 2018-12-05

## 2018-12-04 NOTE — PROGRESS NOTES
[facility] Hospitalist Progress Note                                                                   [address]  1/30/1965    SUBJECTIVE:    No diarrhea since last night.  + c dif     Able t HYDROcodone-acetaminophen, ondansetron HCl, Metoclopramide HCl, buprenorphine HCl, Dicyclomine HCl, hyoscyamine sulfate    Assessment/Plan:  Principal Problem:    Hyponatremia  Active Problems:    Anemia    Hyperglycemia    Intractable pain    Intractable

## 2018-12-04 NOTE — CONSULTS
[facility]  Report of Consultation    Dennise Mathew Patient Status:  Inpatient    1965 MRN [id number]   [facility] 4NW-A Attending Smitha Avila MD   Hosp Day # 1 PCP Clarence Mchugh MD     REASON FOR CONSULTATION: [address] [facility]   • CERVICAL EPIDURAL N/A 9/6/2017    Performed by Rachelle Vasquez MD at [facility]   • COLONOSCOPY  2004    normal sigmoid anastamosis   • COLONOSCOPY  11/2012    diverticulosis and MANAGEMENT   • LUMBAR EPIDURAL N/A 4/11/2018    Performed by Avery Guerrier MD at [address] [place] [address] N/A 6/15/2017    Performed by Avery Guerrier MD at [address] [place] [address] N/A 5/31/2017 Allergies    Medications:    Current Facility-Administered Medications:   •  Vancomycin HCl (FIRVANQ) 50 MG/ML oral solution 125 mg, 125 mg, Oral, QID  •  acetaminophen (TYLENOL) tab 650 mg, 650 mg, Oral, Q6H PRN  •  docusate sodium (COLACE) cap 100 mg, 10 and oriented x 3, not in acute distress. Weak    Vital Signs: /84 (BP Location: Right arm)   Pulse 64   Temp 98.1 °F (36.7 °C) (Oral)   Resp 16   SpO2 100%    HEENT: No Icterus. Oropharynx is dry   Neck:  No palpable lymphadenopathy. Neck is supple. 0.30 x10(3) uL    Basophil Absolute 0.02 0.00 - 0.10 x10(3) uL    Immature Granulocyte Absolute 0.03 0.00 - 1.00 x10(3) uL    Neutrophil % 66.7 %    Lymphocyte % 17.8 %    Monocyte % 12.4 %    Eosinophil % 1.8 %    Basophil % 0.5 %    Immature Granulocyte Hyponatremia     Intractable pain     Intractable vomiting with nausea, unspecified vomiting type        ASSESSMENT AND PLAN:     Meghan Valdes is a 52year old male with    1.  Cancer of the esophagus  Locally advanced disease - on Chemo RT  No treatment

## 2018-12-04 NOTE — PAYOR COMM NOTE
--------------  ADMISSION REVIEW     Payor: [address] PPO  Subscriber #:  [id number]  Authorization Number: [id number]    Admit date: 12/3/18  Admit time: 1406       Admitting Physician: Venice Biggs MD  Attending Physician:  Ebenezer Villasenor right eye   • Chronic pain syndrome    • Depression    • Diverticulosis of colon    • Diverticulosis of colon (without mention of hemorrhage)    • Essential hypertension, benign 8/17/2007   • High blood pressure    • Irritable bowel syndrome    • KIDNEY ST 1/21/2016    Performed by Mery Gallardo MD at [facility] ENDOSCOPY   • ESOPHAGOGASTRODUODENOSCOPY (EGD) N/A 5/27/2015    Performed by Mery Gallardo MD at [facility] ENDOSCOPY   • ESOPHAGOGASTRODUODENOSCOPY (EGD) N/A 12/19/2014    Performed by Mery Gallardo MD at SURGICAL HISTORY  6/2012    egd - botox injection   • OTHER SURGICAL HISTORY  11/2012    egd - botox injection   • OTHER SURGICAL HISTORY  3/5/2013    EGD - botox injection   • SPECIAL SERVICE OR REPORT      colon resection   • SPECIAL SERVICE OR REPORT following components:       Result Value    Glucose 113 (*)     Sodium 130 (*)     Potassium 3.4 (*)     Chloride 94 (*)     BUN 7 (*)     BUN/CREA Ratio 9.3 (*)     Calculated Osmolality 269 (*)     A/G Ratio 0.9 (*)     All other components within normal Impression:  Hyponatremia  (primary encounter diagnosis)  Intractable pain  Intractable vomiting with nausea, unspecified vomiting type    Disposition:  Admit  12/3/2018 11:13 am    Follow-up:  No follow-up provider specified.       Medications Prescribed: weakness     right leg   • Osteoarthritis of hand, left    • Osteoarthritis of hand, right    • Osteoarthritis of lumbar spine    • Osteoarthrosis, unspecified whether generalized or localized, unspecified site     back and hands   • Perforation of small i ESOPHAGOGASTRODUODENOSCOPY (EGD) N/A 8/5/2014    Performed by Quan Richmond MD at [facility] ENDOSCOPY   • ESOPHAGOGASTRODUODENOSCOPY (EGD) N/A 4/2/2014    Performed by Quan Richmond MD at [facility] ENDOSCOPY   • ESOPHAGOGASTRODUODENOSCOPY, POSSIBLE BIOPSY, POSSIBL • TONSILLECTOMY     • UPPER GI ENDOSCOPY,DIAGNOSIS  6/23/16    botox injection into the lower esophagus to treat achalasia;  Edward        ALL:  No Known Allergies       Prior to Admission Medications:  Pantoprazole Sodium 40 MG Oral Tab EC Take 40 mg by 1.00        Years: 25.00        Pack years: 25        Types: Cigarettes        Quit date: 10/17/2018        Years since quittin.1      Smokeless tobacco: Never Used    Alcohol use: No       Fam Hx  Family History   Problem Relation Age of Onset   • Austin BILT 0.4 12/03/2018    TP 6.6 12/03/2018    AST 15 12/03/2018    ALT 18 12/03/2018     12/03/2018       CXR: image personally reviewed.       Radiology: Xr Chest Ap/pa (1 View) (cpt=71045)    Result Date: 11/14/2018  DATE OF SERVICE: 11.13.2018 XR reduction techniques were used. Dose information is transmitted to the [facility] of Radiology) NRDR ([address]) which includes the Dose Index Registry.   PATIENT STATED HISTORY:(As transcribed by Technologist)  Patient sta for patient age. BONES:  No bony lesion or fracture. LUNG BASES:  Linear scarring versus discoid atelectasis in the medial left lung base appears stable. No definite mass. OTHER:  Negative. CONCLUSION:  1. No evidence of bowel obstruction.   There Dicyclomine HCl (BENTYL) cap 10 mg     Date Action Dose Route User    12/4/2018 1007 Given 10 mg Oral Melissa Dianelys, RN    88/3/2027 0507 Given 10 mg Oral Jewel Backbone, RN    12/3/2018 2231 Given 10 mg Oral Jewel Backbone, RN    12/3/2018 1845 Given Dose Route User    12/4/2018 1333 Given 1 g Oral Zina Carrillo RN      Vancomycin HCl PABLITO ESCALONA [facility]) 50 MG/ML oral solution 125 mg     Date Action Dose Route User    12/4/2018 1333 Given 125 mg Oral Gray Antonio RN    29/0/4480 0913 Given 125 mg Oral P 98.1 °F (36.7 °C) 64 16 131/84 100 % — None (Room air) — [facility]   12/03/18 2009 98.1 °F (36.7 °C) 64 16 118/69 100 % — None (Room air) —    12/03/18 1420 98.2 °F (36.8 °C) 52 — 122/71 97 % — — — MILO   12/03/18 1330 — 71 16 132/87 96 % [facility]   12/03/18 124

## 2018-12-05 VITALS
OXYGEN SATURATION: 98 % | HEART RATE: 61 BPM | RESPIRATION RATE: 20 BRPM | WEIGHT: 110.88 LBS | BODY MASS INDEX: 18 KG/M2 | TEMPERATURE: 98 F | DIASTOLIC BLOOD PRESSURE: 70 MMHG | SYSTOLIC BLOOD PRESSURE: 125 MMHG

## 2018-12-05 RX ORDER — MIRTAZAPINE 15 MG/1
15 TABLET, FILM COATED ORAL NIGHTLY
Qty: 30 TABLET | Refills: 0 | Status: SHIPPED | OUTPATIENT
Start: 2018-12-05 | End: 2019-02-14 | Stop reason: ALTCHOICE

## 2018-12-05 NOTE — PROGRESS NOTES
[facility]  Progress Note    Claudetta Golder Patient Status:  Inpatient    1965 MRN [id number]   [facility] 4NW-A Attending Ahsan Mueller MD   Hosp Day # 2 PCP Kaylee Hogan MD     Subjective:    He is feeling better

## 2018-12-05 NOTE — CONSULTS
[facility]  Report of Psychiatric Consultation    Alicjakami Witt Patient Status:  Inpatient    1965 MRN [id number]   [facility] 4NW-A Attending Jeff Trevino MD   Hosp Day # 1 PCP Diana Ford MD     Date of Admissi Use History: Ex-cigarette smoker. Smokes cannabis. Psych Family History: Father with alcohol use disorder. Mother with dementia. Social and Developmental History:  with 1 daughter. He was a semi- before the cancer.  Supportive wife and ENDOSCOPY   • ESOPHAGOGASTRODUODENOSCOPY (EGD) N/A 10/3/2018    Performed by Adan Bumpers, MD at [facility] ENDOSCOPY   • ESOPHAGOGASTRODUODENOSCOPY (EGD) N/A 10/27/2017    Performed by Adan Bumpers, MD at [facility] ENDOSCOPY   • ESOPHAGOGASTRODUODENOSCOPY (EGD) N/ Performed by Jessica Avendaño MD at [address] Bilateral 1/26/2017    Performed by Jessica Avendaño MD at . [facility] 139 Bilateral 2/14/ hydroxide (MILK OF MAGNESIA) 400 MG/5ML suspension 30 mL, 30 mL, Oral, Daily PRN  •  bisacodyl (DULCOLAX) rectal suppository 10 mg, 10 mg, Rectal, Daily PRN  •  FLEET ENEMA (FLEET) 7-19 GM/118ML enema 133 mL, 1 enema, Rectal, Once PRN  •  0.9%  NaCl infusi fair  Memory:  intact recent and intact remote  Language: Intact naming and repetition  Fund of Knowledge: Able to recite name of [place] president    Insight: fair  Judgment: fair    Laboratory Data:  Lab Results   Component Value Date    WBC 3.9 12/04/2018

## 2018-12-05 NOTE — DISCHARGE SUMMARY
General Medicine Discharge Summary     Patient ID:  Castillo Rasmussen  52year old  1/30/1965    Admit date: 12/3/2018    Discharge date and time: 12/5/2018    Attending Physician: Werner Lewis MD instructions:      Current Discharge Medication List    START taking these medications    Vancomycin HCl 50 MG/ML Oral Recon Soln  Take 2.5 mL (125 mg total) by mouth 4 (four) times daily for 12 days.       CONTINUE these medications which have NOT CHANGED treatment).           Activity: activity as tolerated  Diet: regular diet  Wound Care: as directed  Code Status: Full Code      Exam on day of discharge:      12/05/18  0500   BP: 125/70   Pulse: 61   Resp: 20   Temp: 97.8 °F (36.6 °C)       General: no acu

## 2018-12-05 NOTE — PLAN OF CARE
NURSING DISCHARGE NOTE    Discharged Home via Wheelchair. Accompanied by Support staff  Belongings Taken by patient/family. Pt was anxious to go home this afternoon, but needed Dr Sue Carreno to do script for remeron.  Once script done, pt stated his wife

## 2018-12-05 NOTE — PROGRESS NOTES
Cheerful this am;stated no diarhea since last nite;stated was doing marijuana;he can get it easily;given meds a sordered including prn for abd pain rating 8/10 with reief to 7 only  zofran given for nausea no vomiting;tolerated soft diet for lunch and dinn

## 2018-12-07 NOTE — PAYOR COMM NOTE
--------------  DISCHARGE REVIEW    Payor: [facility]  Subscriber #:  [id number]  Authorization Number: [id number]    Admit date: 12/3/18  Admit time:  6727  Discharge Date: 12/5/2018  6:02 PM     Admitting Physician: Neymar Bowles MD without sig path, possible mild ileus  - + cdif likely explains acute worsening of pain                                  - cont po vanc for 14 days todal  - Dr Victoria Latif to follow, pt on subutex chronically w ho opiate dependency- resume on dc  - also cont raoul once daily. Dicyclomine HCl 10 MG Oral Cap  10 - 20 mg po every 6 hours as needed    hyoscyamine sulfate 0.125 MG Oral Tablet Dispersible  Place 1 tablet (0.125 mg total) under the tongue every 4 (four) hours as needed.     SUBOXONE 8-2 MG Sublingual Cleve

## 2019-01-03 ENCOUNTER — HOSPITAL ENCOUNTER (EMERGENCY)
Facility: HOSPITAL | Age: 54
Discharge: HOME | End: 2019-01-03
Attending: EMERGENCY MEDICINE
Payer: COMMERCIAL

## 2019-01-03 ENCOUNTER — APPOINTMENT (OUTPATIENT)
Dept: GENERAL RADIOLOGY | Facility: HOSPITAL | Age: 54
End: 2019-01-03
Attending: EMERGENCY MEDICINE
Payer: COMMERCIAL

## 2019-01-03 VITALS
WEIGHT: 110 LBS | OXYGEN SATURATION: 95 % | DIASTOLIC BLOOD PRESSURE: 83 MMHG | SYSTOLIC BLOOD PRESSURE: 109 MMHG | TEMPERATURE: 99 F | HEART RATE: 86 BPM | HEIGHT: 66 IN | BODY MASS INDEX: 17.68 KG/M2 | RESPIRATION RATE: 16 BRPM

## 2019-01-03 DIAGNOSIS — R52 PAIN AFTER RADIATION THERAPY: Primary | ICD-10-CM

## 2019-01-03 DIAGNOSIS — J02.9 THROAT SORENESS: ICD-10-CM
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
## 2019-01-03 DIAGNOSIS — E87.1 HYPONATREMIA: ICD-10-CM

## 2019-01-03 LAB
ALBUMIN SERPL-MCNC: 3.8 G/DL (ref 3.1–4.5)
ALBUMIN/GLOB SERPL: 1 {RATIO} (ref 1–2)
ALP LIVER SERPL-CCNC: 87 U/L (ref 45–117)
ALT SERPL-CCNC: 29 U/L (ref 17–63)
ANION GAP SERPL CALC-SCNC: 8 MMOL/L (ref 0–18)
AST SERPL-CCNC: 25 U/L (ref 15–41)
BASOPHILS # BLD AUTO: 0.03 X10(3) UL (ref 0–0.1)
BASOPHILS NFR BLD AUTO: 0.4 %
BILIRUB SERPL-MCNC: 0.4 MG/DL (ref 0.1–2)
BUN BLD-MCNC: 15 MG/DL (ref 8–20)
BUN/CREAT SERPL: 14.2 (ref 10–20)
CALCIUM BLD-MCNC: 9.3 MG/DL (ref 8.3–10.3)
CHLORIDE SERPL-SCNC: 92 MMOL/L (ref 101–111)
CO2 SERPL-SCNC: 28 MMOL/L (ref 22–32)
CREAT BLD-MCNC: 1.06 MG/DL (ref 0.7–1.3)
EOSINOPHIL # BLD AUTO: 0.03 X10(3) UL (ref 0–0.3)
EOSINOPHIL NFR BLD AUTO: 0.4 %
ERYTHROCYTE [DISTWIDTH] IN BLOOD BY AUTOMATED COUNT: 17.1 % (ref 11.5–16)
GLOBULIN PLAS-MCNC: 3.7 G/DL (ref 2.8–4.4)
GLUCOSE BLD-MCNC: 108 MG/DL (ref 70–99)
HCT VFR BLD AUTO: 35.1 % (ref 37–53)
HGB BLD-MCNC: 12.7 G/DL (ref 13–17)
IMMATURE GRANULOCYTE COUNT: 0.02 X10(3) UL (ref 0–1)
IMMATURE GRANULOCYTE RATIO %: 0.3 %
LYMPHOCYTES # BLD AUTO: 0.32 X10(3) UL (ref 0.9–4)
LYMPHOCYTES NFR BLD AUTO: 4.1 %
M PROTEIN MFR SERPL ELPH: 7.5 G/DL (ref 6.4–8.2)
MCH RBC QN AUTO: 30.4 PG (ref 27–33.2)
MCHC RBC AUTO-ENTMCNC: 36.2 G/DL (ref 31–37)
MCV RBC AUTO: 84 FL (ref 80–99)
MONOCYTES # BLD AUTO: 0.84 X10(3) UL (ref 0.1–1)
MONOCYTES NFR BLD AUTO: 10.8 %
NEUTROPHIL ABS PRELIM: 6.51 X10 (3) UL (ref 1.3–6.7)
NEUTROPHILS # BLD AUTO: 6.51 X10(3) UL (ref 1.3–6.7)
NEUTROPHILS NFR BLD AUTO: 84 %
OSMOLALITY SERPL CALC.SUM OF ELEC: 267 MOSM/KG (ref 275–295)
PLATELET # BLD AUTO: 196 10(3)UL (ref 150–450)
POTASSIUM SERPL-SCNC: 3.4 MMOL/L (ref 3.6–5.1)
RBC # BLD AUTO: 4.18 X10(6)UL (ref 4.3–5.7)
RED CELL DISTRIBUTION WIDTH-SD: 50.4 FL (ref 35.1–46.3)
SODIUM SERPL-SCNC: 128 MMOL/L (ref 136–144)
WBC # BLD AUTO: 7.8 X10(3) UL (ref 4–13)

## 2019-01-03 PROCEDURE — 80053 COMPREHEN METABOLIC PANEL: CPT | Performed by: EMERGENCY MEDICINE

## 2019-01-03 PROCEDURE — 96375 TX/PRO/DX INJ NEW DRUG ADDON: CPT

## 2019-01-03 PROCEDURE — 99285 EMERGENCY DEPT VISIT HI MDM: CPT

## 2019-01-03 PROCEDURE — 96365 THER/PROPH/DIAG IV INF INIT: CPT

## 2019-01-03 PROCEDURE — 71045 X-RAY EXAM CHEST 1 VIEW: CPT | Performed by: EMERGENCY MEDICINE

## 2019-01-03 PROCEDURE — 96361 HYDRATE IV INFUSION ADD-ON: CPT

## 2019-01-03 PROCEDURE — 85025 COMPLETE CBC W/AUTO DIFF WBC: CPT | Performed by: EMERGENCY MEDICINE

## 2019-01-03 RX ORDER — METOCLOPRAMIDE HYDROCHLORIDE 5 MG/ML
10 INJECTION INTRAMUSCULAR; INTRAVENOUS ONCE
Status: COMPLETED | OUTPATIENT
Start: 2019-01-03 | End: 2019-01-03

## 2019-01-03 RX ORDER — SODIUM CHLORIDE 9 MG/ML
INJECTION, SOLUTION INTRAVENOUS ONCE
Status: COMPLETED | OUTPATIENT
Start: 2019-01-03 | End: 2019-01-03

## 2019-01-03 RX ORDER — ONDANSETRON 2 MG/ML
4 INJECTION INTRAMUSCULAR; INTRAVENOUS ONCE
Status: COMPLETED | OUTPATIENT
Start: 2019-01-03 | End: 2019-01-03

## 2019-01-03 RX ORDER — MORPHINE SULFATE 4 MG/ML
4 INJECTION, SOLUTION INTRAMUSCULAR; INTRAVENOUS ONCE
Status: COMPLETED | OUTPATIENT
Start: 2019-01-03 | End: 2019-01-03

## 2019-01-03 RX ORDER — HYDROMORPHONE HYDROCHLORIDE 1 MG/ML
1 INJECTION, SOLUTION INTRAMUSCULAR; INTRAVENOUS; SUBCUTANEOUS EVERY 30 MIN PRN
Status: DISCONTINUED | OUTPATIENT
Start: 2019-01-03 | End: 2019-01-03

## 2019-01-03 RX ORDER — HYDROCODONE BITARTRATE AND ACETAMINOPHEN 5; 325 MG/1; MG/1
1-2 TABLET ORAL EVERY 4 HOURS PRN
Qty: 10 TABLET | Refills: 0 | Status: SHIPPED | OUTPATIENT
Start: 2019-01-03 | End: 2019-01-10

## 2019-01-03 RX ORDER — KETAMINE HYDROCHLORIDE 50 MG/ML
0.2 INJECTION, SOLUTION, CONCENTRATE INTRAMUSCULAR; INTRAVENOUS ONCE
Status: DISCONTINUED | OUTPATIENT
Start: 2019-01-03 | End: 2019-01-03

## 2019-01-03 RX ORDER — KETAMINE HYDROCHLORIDE 50 MG/ML
1 INJECTION, SOLUTION, CONCENTRATE INTRAMUSCULAR; INTRAVENOUS ONCE
Status: DISCONTINUED | OUTPATIENT
Start: 2019-01-03 | End: 2019-01-03

## 2019-01-03 RX ORDER — DIPHENHYDRAMINE HYDROCHLORIDE 50 MG/ML
50 INJECTION INTRAMUSCULAR; INTRAVENOUS ONCE
Status: COMPLETED | OUTPATIENT
Start: 2019-01-03 | End: 2019-01-03

## 2019-01-03 RX ORDER — HYDROCODONE BITARTRATE AND ACETAMINOPHEN 5; 325 MG/1; MG/1
1-2 TABLET ORAL EVERY 4 HOURS PRN
Qty: 20 TABLET | Refills: 0 | Status: SHIPPED | OUTPATIENT
Start: 2019-01-03 | End: 2019-01-03

## 2019-01-03 NOTE — ED INITIAL ASSESSMENT (HPI)
Pt here with c.o severe pain to throat, pt states throat cancer, been getting radiation, states cannot eat or drink anything due to the pain.

## 2019-01-04 NOTE — ED PROVIDER NOTES
Patient Seen in: [facility] Emergency Department    History   Patient presents with:  Dehydration (metabolic/constitutional)    Stated Complaint: Has esophageal Ca.  Last radiation Tx has burned him and he is having difficulty*    HPI    53 rolled ma Alva Valenzuela MD at [address] N/A 9/6/2017    Performed by Alva Valenzuela MD at [facility] 64   • COLONOSCOPY  2004    normal sigmoid anastamosis   • COLONOSCOPY  11/201 at [address] [place] [address] N/A 4/11/2018    Performed by Keyur Paulson MD at [address] [place] [address] N/A 6/15/2017    Performed by Keyur Paulson MD at [address] [place] [address] Dr JONES are as noted in HPI. Constitutional and vital signs reviewed. All other systems reviewed and negative except as noted above.     Physical Exam     ED Triage Vitals [01/03/19 1650]   /80   Pulse 117   Resp 18   Temp 99 °F (37.2 °C)   Temp src Tem components within normal limits   CBC WITH DIFFERENTIAL WITH PLATELET    Narrative: The following orders were created for panel order CBC WITH DIFFERENTIAL WITH PLATELET.   Procedure                               Abnormality         Status his throat as well as well as using cool compresses on the radiation burn areas I advised that he contacted the radiation oncologist for other modes of treatment as well as his physician who manages his opioid addiction.   Patient was subsequently discharge

## 2019-02-01 PROBLEM — C15.4 MALIGNANT NEOPLASM OF MIDDLE THIRD OF ESOPHAGUS (HCC): Status: ACTIVE | Noted: 2019-02-01

## 2019-02-05 ENCOUNTER — APPOINTMENT (OUTPATIENT)
Dept: CT IMAGING | Facility: HOSPITAL | Age: 54
End: 2019-02-05
Attending: EMERGENCY MEDICINE
Payer: COMMERCIAL

## 2019-02-05 ENCOUNTER — HOSPITAL ENCOUNTER (EMERGENCY)
Facility: HOSPITAL | Age: 54
Discharge: HOME | End: 2019-02-05
Attending: EMERGENCY MEDICINE
Payer: COMMERCIAL

## 2019-02-05 VITALS
HEIGHT: 66 IN | DIASTOLIC BLOOD PRESSURE: 84 MMHG | WEIGHT: 112 LBS | RESPIRATION RATE: 15 BRPM | SYSTOLIC BLOOD PRESSURE: 141 MMHG | TEMPERATURE: 99 F | HEART RATE: 61 BPM | BODY MASS INDEX: 18 KG/M2 | OXYGEN SATURATION: 96 %

## 2019-02-05 DIAGNOSIS — R10.9 CHRONIC ABDOMINAL PAIN: Primary | ICD-10-CM

## 2019-02-05 DIAGNOSIS — G89.29 CHRONIC ABDOMINAL PAIN: Primary | ICD-10-CM

## 2019-02-05 DIAGNOSIS — H10.32 ACUTE CONJUNCTIVITIS OF LEFT EYE, UNSPECIFIED ACUTE CONJUNCTIVITIS TYPE: ICD-10-CM

## 2019-02-05 LAB
ALBUMIN SERPL-MCNC: 3.7 G/DL (ref 3.1–4.5)
ALBUMIN/GLOB SERPL: 1.1 {RATIO} (ref 1–2)
ALP LIVER SERPL-CCNC: 83 U/L (ref 45–117)
ALT SERPL-CCNC: 19 U/L (ref 17–63)
ANION GAP SERPL CALC-SCNC: 8 MMOL/L (ref 0–18)
AST SERPL-CCNC: 17 U/L (ref 15–41)
BASOPHILS # BLD AUTO: 0.02 X10(3) UL (ref 0–0.2)
BASOPHILS NFR BLD AUTO: 0.2 %
BILIRUB SERPL-MCNC: 0.3 MG/DL (ref 0.1–2)
BILIRUB UR QL STRIP.AUTO: NEGATIVE
BUN BLD-MCNC: 10 MG/DL (ref 8–20)
BUN/CREAT SERPL: 12 (ref 10–20)
CALCIUM BLD-MCNC: 9.4 MG/DL (ref 8.3–10.3)
CHLORIDE SERPL-SCNC: 98 MMOL/L (ref 101–111)
CO2 SERPL-SCNC: 28 MMOL/L (ref 22–32)
CREAT BLD-MCNC: 0.83 MG/DL (ref 0.7–1.3)
DEPRECATED RDW RBC AUTO: 50.9 FL (ref 35.1–46.3)
EOSINOPHIL # BLD AUTO: 0.01 X10(3) UL (ref 0–0.7)
EOSINOPHIL NFR BLD AUTO: 0.1 %
ERYTHROCYTE [DISTWIDTH] IN BLOOD BY AUTOMATED COUNT: 16.2 % (ref 11–15)
GLOBULIN PLAS-MCNC: 3.5 G/DL (ref 2.8–4.4)
GLUCOSE BLD-MCNC: 95 MG/DL (ref 70–99)
GLUCOSE UR STRIP.AUTO-MCNC: NEGATIVE MG/DL
HCT VFR BLD AUTO: 37.3 % (ref 39–53)
HGB BLD-MCNC: 13.4 G/DL (ref 13–17.5)
IMM GRANULOCYTES # BLD AUTO: 0.02 X10(3) UL (ref 0–1)
IMM GRANULOCYTES NFR BLD: 0.2 %
KETONES UR STRIP.AUTO-MCNC: NEGATIVE MG/DL
LEUKOCYTE ESTERASE UR QL STRIP.AUTO: NEGATIVE
LYMPHOCYTES # BLD AUTO: 0.54 X10(3) UL (ref 1–4)
LYMPHOCYTES NFR BLD AUTO: 6.6 %
M PROTEIN MFR SERPL ELPH: 7.2 G/DL (ref 6.4–8.2)
MCH RBC QN AUTO: 31.1 PG (ref 26–34)
MCHC RBC AUTO-ENTMCNC: 35.9 G/DL (ref 31–37)
MCV RBC AUTO: 86.5 FL (ref 80–100)
MONOCYTES # BLD AUTO: 0.92 X10(3) UL (ref 0.1–1)
MONOCYTES NFR BLD AUTO: 11.2 %
NEUTROPHILS # BLD AUTO: 6.69 X10 (3) UL (ref 1.5–7.7)
NEUTROPHILS # BLD AUTO: 6.69 X10(3) UL (ref 1.5–7.7)
NEUTROPHILS NFR BLD AUTO: 81.7 %
NITRITE UR QL STRIP.AUTO: NEGATIVE
OSMOLALITY SERPL CALC.SUM OF ELEC: 277 MOSM/KG (ref 275–295)
PH UR STRIP.AUTO: 7 [PH] (ref 4.5–8)
PLATELET # BLD AUTO: 195 10(3)UL (ref 150–450)
POTASSIUM SERPL-SCNC: 3.6 MMOL/L (ref 3.6–5.1)
PROT UR STRIP.AUTO-MCNC: 30 MG/DL
RBC # BLD AUTO: 4.31 X10(6)UL (ref 4.3–5.7)
RBC UR QL AUTO: NEGATIVE
SODIUM SERPL-SCNC: 134 MMOL/L (ref 136–144)
SP GR UR STRIP.AUTO: 1.03 (ref 1–1.03)
UROBILINOGEN UR STRIP.AUTO-MCNC: <2 MG/DL
WBC # BLD AUTO: 8.2 X10(3) UL (ref 4–11)

## 2019-02-05 PROCEDURE — 96375 TX/PRO/DX INJ NEW DRUG ADDON: CPT

## 2019-02-05 PROCEDURE — 96361 HYDRATE IV INFUSION ADD-ON: CPT

## 2019-02-05 PROCEDURE — 87086 URINE CULTURE/COLONY COUNT: CPT | Performed by: EMERGENCY MEDICINE

## 2019-02-05 PROCEDURE — 96365 THER/PROPH/DIAG IV INF INIT: CPT

## 2019-02-05 PROCEDURE — 81001 URINALYSIS AUTO W/SCOPE: CPT | Performed by: EMERGENCY MEDICINE

## 2019-02-05 PROCEDURE — C9113 INJ PANTOPRAZOLE SODIUM, VIA: HCPCS | Performed by: EMERGENCY MEDICINE

## 2019-02-05 PROCEDURE — 85025 COMPLETE CBC W/AUTO DIFF WBC: CPT | Performed by: EMERGENCY MEDICINE

## 2019-02-05 PROCEDURE — 99284 EMERGENCY DEPT VISIT MOD MDM: CPT

## 2019-02-05 PROCEDURE — 80053 COMPREHEN METABOLIC PANEL: CPT | Performed by: EMERGENCY MEDICINE

## 2019-02-05 PROCEDURE — 74177 CT ABD & PELVIS W/CONTRAST: CPT | Performed by: EMERGENCY MEDICINE

## 2019-02-05 RX ORDER — KETAMINE HYDROCHLORIDE 50 MG/ML
0.2 INJECTION, SOLUTION, CONCENTRATE INTRAMUSCULAR; INTRAVENOUS ONCE
Status: DISCONTINUED | OUTPATIENT
Start: 2019-02-05 | End: 2019-02-05 | Stop reason: SDUPTHER

## 2019-02-05 RX ORDER — TOBRAMYCIN 3 MG/ML
2 SOLUTION/ DROPS OPHTHALMIC EVERY 4 HOURS
Qty: 1 BOTTLE | Refills: 0 | Status: SHIPPED | OUTPATIENT
Start: 2019-02-05 | End: 2019-02-15

## 2019-02-05 RX ORDER — TETRACAINE HYDROCHLORIDE 5 MG/ML
1 SOLUTION OPHTHALMIC ONCE
Status: COMPLETED | OUTPATIENT
Start: 2019-02-05 | End: 2019-02-05

## 2019-02-05 RX ORDER — LORAZEPAM 2 MG/ML
1 INJECTION INTRAMUSCULAR ONCE
Status: COMPLETED | OUTPATIENT
Start: 2019-02-05 | End: 2019-02-05

## 2019-02-05 RX ORDER — ONDANSETRON 2 MG/ML
4 INJECTION INTRAMUSCULAR; INTRAVENOUS ONCE
Status: COMPLETED | OUTPATIENT
Start: 2019-02-05 | End: 2019-02-05

## 2019-02-05 RX ORDER — TETRACAINE HYDROCHLORIDE 5 MG/ML
SOLUTION OPHTHALMIC
Status: COMPLETED
Start: 2019-02-05 | End: 2019-02-05

## 2019-02-05 NOTE — ED INITIAL ASSESSMENT (HPI)
C/o abd pain that radiates to his L flank. States he just finished chemo/radiation for esophageal cancer 2 weeks ago. + n/v. No diarrhea. Last bowel movement 2 days ago.

## 2019-02-05 NOTE — ED PROVIDER NOTES
Patient Seen in: [facility] Emergency Department    History   Patient presents with:  Abdomen/Flank Pain (GI/)  Poor Feed Anorexia (constitutional)    Stated Complaint: abdominal pain, poor PO intake    HPI    47-year-old male comes to the [facility] N/A 9/14/2018    Performed by Andre Valerio MD at [address] [facility] N/A 8/27/2018    Performed by Andre Valerio MD at [address] [facility] N/A 8/10/2018    Performed by Silvio Billingsley 11/13/2018    Performed by Juan Reyes MD at [address] [facility] N/A 6/4/2018    Performed by Luz Marina Wilder MD at [address] [place] [address] N/A 5/16/2018    Performed by Luz Marina Wilder MD at [facility] Types: Cannabis      Comment: marijuana daily      Review of Systems    Positive for stated complaint: abdominal pain, poor PO intake  Other systems are as noted in HPI. Constitutional and vital signs reviewed.       All other systems reviewed and negative -----------         ------                     CBC W/ DIFFERENTIAL[[id number]]          Abnormal            Final result                 Please view results for these tests on the individual orders.    URINALYSIS WITH CULTURE REFLEX   JENNIFER TRAN is a small hiatal hernia. There are stable scattered nonspecific calcifications along the peritoneal reflections. There is no large or small bowel dilatation. There is a small amount of free fluid the pelvis. Scattered feces in the colon. No free air. unspecified acute conjunctivitis type    Disposition:  Discharge  2/5/2019  8:37 pm    Follow-up:  Sherrell Cardona MD  [address] [address] [place]    Schedule an appointment as soon as possible for a visit in 2 days

## 2019-02-05 NOTE — ED NOTES
Patient presents with c/o pain in his \"large intestine and sigmoid colon. \" He points to his left rib/flank area. He also states c/o poor PO intake.

## 2019-02-06 PROBLEM — C15.9 SQUAMOUS CELL CARCINOMA OF ESOPHAGUS (HCC): Status: RESOLVED | Noted: 2018-11-01 | Resolved: 2019-02-06

## 2019-02-06 PROBLEM — C15.9 SQUAMOUS CELL CARCINOMA, ESOPHAGUS (HCC): Status: ACTIVE | Noted: 2019-02-06

## 2019-02-18 RX ORDER — LISINOPRIL 10 MG/1
10 TABLET ORAL DAILY
COMMUNITY
End: 2019-09-20

## 2019-02-19 ENCOUNTER — ANESTHESIA (OUTPATIENT)
Dept: ENDOSCOPY | Facility: HOSPITAL | Age: 54
End: 2019-02-19
Payer: COMMERCIAL

## 2019-02-19 ENCOUNTER — HOSPITAL ENCOUNTER (OUTPATIENT)
Facility: HOSPITAL | Age: 54
Setting detail: HOSPITAL OUTPATIENT SURGERY
Discharge: HOME | End: 2019-02-19
Attending: INTERNAL MEDICINE | Admitting: INTERNAL MEDICINE
Payer: COMMERCIAL

## 2019-02-19 ENCOUNTER — ANESTHESIA EVENT (OUTPATIENT)
Dept: ENDOSCOPY | Facility: HOSPITAL | Age: 54
End: 2019-02-19
Payer: COMMERCIAL

## 2019-02-19 VITALS
BODY MASS INDEX: 17.68 KG/M2 | SYSTOLIC BLOOD PRESSURE: 98 MMHG | HEIGHT: 66 IN | RESPIRATION RATE: 16 BRPM | TEMPERATURE: 98 F | OXYGEN SATURATION: 96 % | DIASTOLIC BLOOD PRESSURE: 70 MMHG | WEIGHT: 110 LBS | HEART RATE: 57 BPM

## 2019-02-19 DIAGNOSIS — C15.9 MALIGNANT NEOPLASM OF ESOPHAGUS, UNSPECIFIED LOCATION (HCC): ICD-10-CM

## 2019-02-19 PROCEDURE — 0DJ08ZZ INSPECTION OF UPPER INTESTINAL TRACT, VIA NATURAL OR ARTIFICIAL OPENING ENDOSCOPIC: ICD-10-PCS | Performed by: INTERNAL MEDICINE

## 2019-02-19 RX ORDER — SODIUM CHLORIDE, SODIUM LACTATE, POTASSIUM CHLORIDE, CALCIUM CHLORIDE 600; 310; 30; 20 MG/100ML; MG/100ML; MG/100ML; MG/100ML
INJECTION, SOLUTION INTRAVENOUS CONTINUOUS
Status: DISCONTINUED | OUTPATIENT
Start: 2019-02-19 | End: 2019-02-19

## 2019-02-19 RX ORDER — NALOXONE HYDROCHLORIDE 0.4 MG/ML
80 INJECTION, SOLUTION INTRAMUSCULAR; INTRAVENOUS; SUBCUTANEOUS AS NEEDED
Status: DISCONTINUED | OUTPATIENT
Start: 2019-02-19 | End: 2019-02-19

## 2019-02-19 NOTE — ANESTHESIA POSTPROCEDURE EVALUATION
[address] Patient Status:  Hospital Outpatient Surgery   Age/Gender 62year old male MRN [id number]   Location [facility]. Attending Nazanin Roe MD   Hosp Day # 0 PCP Carlyon Claude, MD       Anesthesia Post-op

## 2019-02-19 NOTE — ANESTHESIA PREPROCEDURE EVALUATION
PRE-OP EVALUATION    Patient Name: Homer Wei    Pre-op Diagnosis: Mal[facility] [C15.9]    Procedure(s):  ENDOSCOPIC ULTRASOUND  ESOPHAGOGASTRODUODENOSCOPY    Surgeon(s) and Role:     Liseth Tarn Evaluation        Anesthetic Complications           GI/Hepatic/Renal                                 Cardiovascular                  (+) hypertension                                     Endo/Other                                  Pulmonary Matt Maciel MD at [facility] ENDOSCOPY   • ESOPHAGOGASTRODUODENOSCOPY (EGD) N/A 4/2/2014    Performed by Matt Maciel MD at [facility] ENDOSCOPY   • ESOPHAGOGASTRODUODENOSCOPY, POSSIBLE BIOPSY, POSSIBLE POLYPECTOMY 93838 N/A 6/8/2011    Performed by Waleska Dickson botox injection into the lower esophagus to treat achalasia;  Edjonathan     Social History    Tobacco Use      Smoking status: Former Smoker        Packs/day: 1.00        Years: 25.00        Pack years: 25        Types: Cigarettes        Quit date: 10/17/20 patient                Present on Admission:  **None**

## 2019-02-19 NOTE — OPERATIVE REPORT
OPERATIVE REPORT   PATIENT NAME: Shamir Clements  MRN: [id number]  DATE OF OPERATION: 2/19/2019  PREOPERATIVE DIAGNOSIS:   1. 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 Here for restaging.   P measuring approximate 1.2 cm where the tumor is. Although there is reduction in the tumor mass there still appeared to be residual disease with involvement of the adventitia.   The previously seen tumor surrounding the descending aorta has improved signifi

## 2019-02-19 NOTE — H&P
[facility] Department of  Gastroenterology  Update Health History :       Radha Waldron  male   Sangeetha Reid MD     [id number]  1/30/1965 Primary Care Physician  Gordo Swan MD        HPI :  Squamous cell carcinoma of the esophagus pepper COLONOSCOPY  11/2012    diverticulosis and sigmoid anastomosis   • ENDOSCOPIC ULTRASOUND (EUS) N/A 11/5/2018    Performed by Mariusz Ocampo MD at [facility] ENDOSCOPY   • ESOPHAGOGASTRODUODENOSCOPY (EGD) N/A 11/5/2018    Performed by Mariusz Ocampo MD at [facility] EN MANAGEMENT   • LUMBAR EPIDURAL N/A 5/31/2017    Performed by Kelsi Hercules MD at [address] 5/16/2017    Performed by Kelsi Hercules MD at [address] [facility] N/A 3/11/2015 Prior to Encounter:  lisinopril 10 MG Oral Tab Take 10 mg by mouth daily.  Disp:  Rfl:    Benadryl/Maalox/Lidocaine 1:1:1 solution Take 5-10 mL by mouth TID &HS. [address]-78 Disp: 300 mL Rfl: 0   fentaNYL 25 MCG/HR Transdermal Patch 72 Hr Place 1 patch 16   Ht 5' 6\" (1.676 m)   Wt 110 lb (49.9 kg)   SpO2 98%   BMI 17.75 kg/m²     Physical Exam:    GENERAL: well developed, well nourished, in no apparent distress   HEENT: PERRLA,  clear   NECK: supple,    LUNGS: clear to auscultation   CARDIO: RRR without

## 2019-02-20 ENCOUNTER — APPOINTMENT (OUTPATIENT)
Dept: CT IMAGING | Facility: HOSPITAL | Age: 54
End: 2019-02-20
Attending: NURSE PRACTITIONER
Payer: COMMERCIAL

## 2019-02-20 ENCOUNTER — HOSPITAL ENCOUNTER (EMERGENCY)
Facility: HOSPITAL | Age: 54
Discharge: HOME | End: 2019-02-20
Attending: EMERGENCY MEDICINE
Payer: COMMERCIAL

## 2019-02-20 VITALS
DIASTOLIC BLOOD PRESSURE: 68 MMHG | TEMPERATURE: 98 F | HEART RATE: 52 BPM | BODY MASS INDEX: 18 KG/M2 | WEIGHT: 110 LBS | RESPIRATION RATE: 17 BRPM | SYSTOLIC BLOOD PRESSURE: 116 MMHG | OXYGEN SATURATION: 96 %

## 2019-02-20 DIAGNOSIS — G89.29 CHRONIC ABDOMINAL PAIN: Primary | ICD-10-CM

## 2019-02-20 DIAGNOSIS — R10.9 CHRONIC ABDOMINAL PAIN: Primary | ICD-10-CM

## 2019-02-20 LAB
ALBUMIN SERPL-MCNC: 3.7 G/DL (ref 3.4–5)
ALBUMIN/GLOB SERPL: 1.1 {RATIO} (ref 1–2)
ALP LIVER SERPL-CCNC: 80 U/L (ref 45–117)
ALT SERPL-CCNC: 15 U/L (ref 16–61)
ANION GAP SERPL CALC-SCNC: 8 MMOL/L (ref 0–18)
AST SERPL-CCNC: 14 U/L (ref 15–37)
ATRIAL RATE: 57 BPM
BASOPHILS # BLD AUTO: 0.01 X10(3) UL (ref 0–0.2)
BASOPHILS NFR BLD AUTO: 0.1 %
BILIRUB SERPL-MCNC: 0.5 MG/DL (ref 0.1–2)
BUN BLD-MCNC: 11 MG/DL (ref 7–18)
BUN/CREAT SERPL: 12.8 (ref 10–20)
CALCIUM BLD-MCNC: 9.3 MG/DL (ref 8.5–10.1)
CHLORIDE SERPL-SCNC: 97 MMOL/L (ref 98–107)
CO2 SERPL-SCNC: 29 MMOL/L (ref 21–32)
CREAT BLD-MCNC: 0.86 MG/DL (ref 0.7–1.3)
DEPRECATED RDW RBC AUTO: 48 FL (ref 35.1–46.3)
EOSINOPHIL # BLD AUTO: 0 X10(3) UL (ref 0–0.7)
EOSINOPHIL NFR BLD AUTO: 0 %
ERYTHROCYTE [DISTWIDTH] IN BLOOD BY AUTOMATED COUNT: 14.8 % (ref 11–15)
GLOBULIN PLAS-MCNC: 3.4 G/DL (ref 2.8–4.4)
GLUCOSE BLD-MCNC: 111 MG/DL (ref 70–99)
HCT VFR BLD AUTO: 36.1 % (ref 39–53)
HGB BLD-MCNC: 13 G/DL (ref 13–17.5)
IMM GRANULOCYTES # BLD AUTO: 0.03 X10(3) UL (ref 0–1)
IMM GRANULOCYTES NFR BLD: 0.3 %
LYMPHOCYTES # BLD AUTO: 0.32 X10(3) UL (ref 1–4)
LYMPHOCYTES NFR BLD AUTO: 3.4 %
M PROTEIN MFR SERPL ELPH: 7.1 G/DL (ref 6.4–8.2)
MCH RBC QN AUTO: 31.6 PG (ref 26–34)
MCHC RBC AUTO-ENTMCNC: 36 G/DL (ref 31–37)
MCV RBC AUTO: 87.6 FL (ref 80–100)
MONOCYTES # BLD AUTO: 0.63 X10(3) UL (ref 0.1–1)
MONOCYTES NFR BLD AUTO: 6.7 %
NEUTROPHILS # BLD AUTO: 8.45 X10 (3) UL (ref 1.5–7.7)
NEUTROPHILS # BLD AUTO: 8.45 X10(3) UL (ref 1.5–7.7)
NEUTROPHILS NFR BLD AUTO: 89.5 %
OSMOLALITY SERPL CALC.SUM OF ELEC: 278 MOSM/KG (ref 275–295)
P AXIS: 78 DEGREES
P-R INTERVAL: 144 MS
PLATELET # BLD AUTO: 210 10(3)UL (ref 150–450)
POTASSIUM SERPL-SCNC: 3.8 MMOL/L (ref 3.5–5.1)
Q-T INTERVAL: 474 MS
QRS DURATION: 90 MS
QTC CALCULATION (BEZET): 461 MS
R AXIS: 87 DEGREES
RBC # BLD AUTO: 4.12 X10(6)UL (ref 4.3–5.7)
SODIUM SERPL-SCNC: 134 MMOL/L (ref 136–145)
T AXIS: 82 DEGREES
VENTRICULAR RATE: 57 BPM
WBC # BLD AUTO: 9.4 X10(3) UL (ref 4–11)

## 2019-02-20 PROCEDURE — 85025 COMPLETE CBC W/AUTO DIFF WBC: CPT | Performed by: NURSE PRACTITIONER

## 2019-02-20 PROCEDURE — 93005 ELECTROCARDIOGRAM TRACING: CPT

## 2019-02-20 PROCEDURE — 99285 EMERGENCY DEPT VISIT HI MDM: CPT

## 2019-02-20 PROCEDURE — 96375 TX/PRO/DX INJ NEW DRUG ADDON: CPT

## 2019-02-20 PROCEDURE — 96376 TX/PRO/DX INJ SAME DRUG ADON: CPT

## 2019-02-20 PROCEDURE — 93010 ELECTROCARDIOGRAM REPORT: CPT

## 2019-02-20 PROCEDURE — 96374 THER/PROPH/DIAG INJ IV PUSH: CPT

## 2019-02-20 PROCEDURE — 80053 COMPREHEN METABOLIC PANEL: CPT | Performed by: NURSE PRACTITIONER

## 2019-02-20 PROCEDURE — 96361 HYDRATE IV INFUSION ADD-ON: CPT

## 2019-02-20 PROCEDURE — 74177 CT ABD & PELVIS W/CONTRAST: CPT | Performed by: NURSE PRACTITIONER

## 2019-02-20 PROCEDURE — 71270 CT THORAX DX C-/C+: CPT | Performed by: NURSE PRACTITIONER

## 2019-02-20 RX ORDER — MORPHINE SULFATE 4 MG/ML
4 INJECTION, SOLUTION INTRAMUSCULAR; INTRAVENOUS ONCE
Status: COMPLETED | OUTPATIENT
Start: 2019-02-20 | End: 2019-02-20

## 2019-02-20 RX ORDER — MORPHINE SULFATE 4 MG/ML
2 INJECTION, SOLUTION INTRAMUSCULAR; INTRAVENOUS ONCE
Status: COMPLETED | OUTPATIENT
Start: 2019-02-20 | End: 2019-02-20

## 2019-02-20 RX ORDER — METOCLOPRAMIDE HYDROCHLORIDE 5 MG/ML
10 INJECTION INTRAMUSCULAR; INTRAVENOUS ONCE
Status: COMPLETED | OUTPATIENT
Start: 2019-02-20 | End: 2019-02-20

## 2019-02-20 RX ORDER — DIPHENHYDRAMINE HYDROCHLORIDE 50 MG/ML
25 INJECTION INTRAMUSCULAR; INTRAVENOUS ONCE
Status: COMPLETED | OUTPATIENT
Start: 2019-02-20 | End: 2019-02-20

## 2019-02-20 RX ORDER — ONDANSETRON 2 MG/ML
4 INJECTION INTRAMUSCULAR; INTRAVENOUS ONCE
Status: COMPLETED | OUTPATIENT
Start: 2019-02-20 | End: 2019-02-20

## 2019-02-20 NOTE — ED INITIAL ASSESSMENT (HPI)
Pt with history of esophageal cancer, had Upper GI yesterday. Pt c/o abd pain and vomiting x5 since midnight, zofran odt not working. Unable to take medications.

## 2019-02-20 NOTE — ED NOTES
OPERATIVE REPORT        Procedure Date: 11/13/2018  Patient Name: Gabby Ramos  Preoperative Diagnosis: squamous cell carcinoma of esophagus  Postoperative Diagnosis: same  Primary Surgeon: Surgeon(s) and Role:     Mary Jo Aguilera MD - [address] [address] [place] [address] [address]

## 2019-02-20 NOTE — ED PROVIDER NOTES
Patient Seen in: [facility] Emergency Department    History   Patient presents with:  Fatigue (constitutional, neurologic)    Stated Complaint: dehydration, post procedure pain, endoscopy, ca hx    HPI  Patient is a 10-year-old gentleman with past m Essential hypertension, benign 8/17/2007   • High blood pressure    • Irritable bowel syndrome    • KIDNEY STONE    • Muscle weakness     right leg   • Osteoarthritis of hand, left    • Osteoarthritis of hand, right    • Osteoarthritis of lumbar spine    • at [facility] ENDOSCOPY   • ESOPHAGOGASTRODUODENOSCOPY (EGD) N/A 12/19/2014    Performed by Joanna Richey MD at [facility] ENDOSCOPY   • ESOPHAGOGASTRODUODENOSCOPY (EGD) N/A 8/5/2014    Performed by Joanna Richey MD at [facility] ENDOSCOPY   • ESOPHAGOGASTRODUODENOSCOPY (EG EGD - botox injection   • SPECIAL SERVICE OR REPORT      colon resection   • SPECIAL SERVICE OR REPORT      esphagus surgery   • TONSILLECTOMY     • UPPER GI ENDOSCOPY,DIAGNOSIS  6/23/16    botox injection into the lower esophagus to treat achalasia;  Florin Grayson note and vitals reviewed.               ED Course     Labs Reviewed   COMP METABOLIC PANEL (14) - Abnormal; Notable for the following components:       Result Value    Glucose 111 (*)     Sodium 134 (*)     Chloride 97 (*)     AST 14 (*)     ALT 15 (*) diverticulosis without inflammation. There is stable diffuse thickening of the distal esophagus that may be related to esophageal cancer and/or treatment.     CT chest-diffuse thickening of distal esophagus which may be related to known esophageal cancer a

## 2019-02-25 PROCEDURE — 88173 CYTOPATH EVAL FNA REPORT: CPT | Performed by: SURGERY

## 2019-02-25 PROCEDURE — 88305 TISSUE EXAM BY PATHOLOGIST: CPT | Performed by: SURGERY

## 2019-03-06 PROBLEM — G89.29 CHRONIC RIGHT-SIDED THORACIC BACK PAIN: Status: RESOLVED | Noted: 2017-08-08 | Resolved: 2019-03-06

## 2019-03-06 PROBLEM — C15.4 MALIGNANT NEOPLASM OF MIDDLE THIRD OF ESOPHAGUS (HCC): Status: RESOLVED | Noted: 2019-02-01 | Resolved: 2019-03-06

## 2019-03-06 PROBLEM — R73.9 HYPERGLYCEMIA: Status: RESOLVED | Noted: 2018-12-03 | Resolved: 2019-03-06

## 2019-03-06 PROBLEM — M54.6 CHRONIC RIGHT-SIDED THORACIC BACK PAIN: Status: RESOLVED | Noted: 2017-08-08 | Resolved: 2019-03-06

## 2019-03-06 PROBLEM — C15.9 SQUAMOUS CELL CARCINOMA, ESOPHAGUS (HCC): Status: RESOLVED | Noted: 2019-02-06 | Resolved: 2019-03-06

## 2019-03-22 PROBLEM — C15.9 SQUAMOUS CELL CARCINOMA OF ESOPHAGUS (HCC): Status: ACTIVE | Noted: 2019-03-08

## 2019-04-08 ENCOUNTER — HOSPITAL ENCOUNTER (INPATIENT)
Facility: HOSPITAL | Age: 54
LOS: 4 days | Discharge: HOME | DRG: 391 | End: 2019-04-12
Attending: EMERGENCY MEDICINE | Admitting: INTERNAL MEDICINE
Payer: COMMERCIAL

## 2019-04-08 ENCOUNTER — APPOINTMENT (OUTPATIENT)
Dept: CT IMAGING | Facility: HOSPITAL | Age: 54
DRG: 391 | End: 2019-04-08
Attending: EMERGENCY MEDICINE
Payer: COMMERCIAL

## 2019-04-08 DIAGNOSIS — R10.9 ABDOMINAL PAIN OF UNKNOWN ETIOLOGY: ICD-10-CM

## 2019-04-08 DIAGNOSIS — C15.9 MALIGNANT NEOPLASM OF ESOPHAGUS, UNSPECIFIED LOCATION (HCC): Primary | ICD-10-CM

## 2019-04-08 DIAGNOSIS — R11.2 NAUSEA AND VOMITING, INTRACTABILITY OF VOMITING NOT SPECIFIED, UNSPECIFIED VOMITING TYPE: ICD-10-CM

## 2019-04-08 PROBLEM — E87.1 HYPONATREMIA: Status: ACTIVE | Noted: 2019-04-08

## 2019-04-08 PROBLEM — E87.6 HYPOKALEMIA: Status: ACTIVE | Noted: 2019-04-08

## 2019-04-08 PROBLEM — R73.9 HYPERGLYCEMIA: Status: ACTIVE | Noted: 2019-04-08

## 2019-04-08 PROCEDURE — 96374 THER/PROPH/DIAG INJ IV PUSH: CPT

## 2019-04-08 PROCEDURE — 85025 COMPLETE CBC W/AUTO DIFF WBC: CPT | Performed by: EMERGENCY MEDICINE

## 2019-04-08 PROCEDURE — 96376 TX/PRO/DX INJ SAME DRUG ADON: CPT

## 2019-04-08 PROCEDURE — 80053 COMPREHEN METABOLIC PANEL: CPT | Performed by: EMERGENCY MEDICINE

## 2019-04-08 PROCEDURE — 99285 EMERGENCY DEPT VISIT HI MDM: CPT

## 2019-04-08 PROCEDURE — 83690 ASSAY OF LIPASE: CPT | Performed by: EMERGENCY MEDICINE

## 2019-04-08 PROCEDURE — 74177 CT ABD & PELVIS W/CONTRAST: CPT | Performed by: EMERGENCY MEDICINE

## 2019-04-08 PROCEDURE — 96361 HYDRATE IV INFUSION ADD-ON: CPT

## 2019-04-08 PROCEDURE — 96375 TX/PRO/DX INJ NEW DRUG ADDON: CPT

## 2019-04-08 RX ORDER — OXYCODONE HYDROCHLORIDE 15 MG/1
15 TABLET ORAL EVERY 4 HOURS PRN
Status: DISCONTINUED | OUTPATIENT
Start: 2019-04-08 | End: 2019-04-12

## 2019-04-08 RX ORDER — HYDROMORPHONE HYDROCHLORIDE 1 MG/ML
1 INJECTION, SOLUTION INTRAMUSCULAR; INTRAVENOUS; SUBCUTANEOUS EVERY 30 MIN PRN
Status: COMPLETED | OUTPATIENT
Start: 2019-04-08 | End: 2019-04-08

## 2019-04-08 RX ORDER — OXYCODONE HYDROCHLORIDE 15 MG/1
30 TABLET ORAL EVERY 4 HOURS PRN
Status: DISCONTINUED | OUTPATIENT
Start: 2019-04-08 | End: 2019-04-12

## 2019-04-08 RX ORDER — SODIUM CHLORIDE 9 MG/ML
INJECTION, SOLUTION INTRAVENOUS CONTINUOUS
Status: ACTIVE | OUTPATIENT
Start: 2019-04-08 | End: 2019-04-08

## 2019-04-08 RX ORDER — CLONAZEPAM 0.5 MG/1
0.5 TABLET ORAL 2 TIMES DAILY
Status: DISCONTINUED | OUTPATIENT
Start: 2019-04-08 | End: 2019-04-12

## 2019-04-08 RX ORDER — ONDANSETRON 2 MG/ML
4 INJECTION INTRAMUSCULAR; INTRAVENOUS EVERY 4 HOURS PRN
Status: COMPLETED | OUTPATIENT
Start: 2019-04-08 | End: 2019-04-09

## 2019-04-08 RX ORDER — PANTOPRAZOLE SODIUM 40 MG/1
40 TABLET, DELAYED RELEASE ORAL 2 TIMES DAILY
Status: DISCONTINUED | OUTPATIENT
Start: 2019-04-08 | End: 2019-04-12

## 2019-04-08 RX ORDER — METOCLOPRAMIDE HYDROCHLORIDE 5 MG/ML
10 INJECTION INTRAMUSCULAR; INTRAVENOUS EVERY 8 HOURS PRN
Status: DISCONTINUED | OUTPATIENT
Start: 2019-04-08 | End: 2019-04-12

## 2019-04-08 RX ORDER — AMOXICILLIN 250 MG
1-2 CAPSULE ORAL DAILY
COMMUNITY
Start: 2019-03-27 | End: 2020-09-11

## 2019-04-08 RX ORDER — HYDROMORPHONE HYDROCHLORIDE 1 MG/ML
0.5 INJECTION, SOLUTION INTRAMUSCULAR; INTRAVENOUS; SUBCUTANEOUS EVERY 2 HOUR PRN
Status: DISCONTINUED | OUTPATIENT
Start: 2019-04-08 | End: 2019-04-08

## 2019-04-08 RX ORDER — SODIUM CHLORIDE 9 MG/ML
1000 INJECTION, SOLUTION INTRAVENOUS ONCE
Status: COMPLETED | OUTPATIENT
Start: 2019-04-08 | End: 2019-04-08

## 2019-04-08 RX ORDER — ONDANSETRON 2 MG/ML
4 INJECTION INTRAMUSCULAR; INTRAVENOUS EVERY 6 HOURS PRN
Status: DISCONTINUED | OUTPATIENT
Start: 2019-04-08 | End: 2019-04-12

## 2019-04-08 RX ORDER — PREGABALIN 100 MG/1
100 CAPSULE ORAL 2 TIMES DAILY
Status: DISCONTINUED | OUTPATIENT
Start: 2019-04-08 | End: 2019-04-12

## 2019-04-08 RX ORDER — HYDROMORPHONE HYDROCHLORIDE 1 MG/ML
1 INJECTION, SOLUTION INTRAMUSCULAR; INTRAVENOUS; SUBCUTANEOUS EVERY 2 HOUR PRN
Status: DISCONTINUED | OUTPATIENT
Start: 2019-04-08 | End: 2019-04-08

## 2019-04-08 RX ORDER — HYDROMORPHONE HYDROCHLORIDE 1 MG/ML
1-2 INJECTION, SOLUTION INTRAMUSCULAR; INTRAVENOUS; SUBCUTANEOUS EVERY 2 HOUR PRN
Status: DISCONTINUED | OUTPATIENT
Start: 2019-04-08 | End: 2019-04-12

## 2019-04-08 RX ORDER — LISINOPRIL 10 MG/1
10 TABLET ORAL DAILY
Status: DISCONTINUED | OUTPATIENT
Start: 2019-04-08 | End: 2019-04-12

## 2019-04-08 RX ORDER — ENOXAPARIN SODIUM 100 MG/ML
40 INJECTION SUBCUTANEOUS DAILY
Status: DISCONTINUED | OUTPATIENT
Start: 2019-04-08 | End: 2019-04-12

## 2019-04-08 RX ORDER — FLUTICASONE PROPIONATE 50 MCG
2 SPRAY, SUSPENSION (ML) NASAL DAILY
Status: DISCONTINUED | OUTPATIENT
Start: 2019-04-08 | End: 2019-04-12

## 2019-04-08 RX ORDER — SENNA AND DOCUSATE SODIUM 50; 8.6 MG/1; MG/1
1-2 TABLET, FILM COATED ORAL DAILY
Status: DISCONTINUED | OUTPATIENT
Start: 2019-04-08 | End: 2019-04-12

## 2019-04-08 RX ORDER — HYDROMORPHONE HYDROCHLORIDE 1 MG/ML
0.5 INJECTION, SOLUTION INTRAMUSCULAR; INTRAVENOUS; SUBCUTANEOUS EVERY 30 MIN PRN
Status: ACTIVE | OUTPATIENT
Start: 2019-04-08 | End: 2019-04-08

## 2019-04-08 RX ORDER — ONDANSETRON 2 MG/ML
4 INJECTION INTRAMUSCULAR; INTRAVENOUS ONCE
Status: COMPLETED | OUTPATIENT
Start: 2019-04-08 | End: 2019-04-08

## 2019-04-08 RX ORDER — GABAPENTIN 300 MG/1
300 CAPSULE ORAL SEE ADMIN INSTRUCTIONS
COMMUNITY
Start: 2019-03-27 | End: 2019-09-20

## 2019-04-08 RX ORDER — HYDROMORPHONE HYDROCHLORIDE 1 MG/ML
0.5 INJECTION, SOLUTION INTRAMUSCULAR; INTRAVENOUS; SUBCUTANEOUS EVERY 2 HOUR PRN
Status: DISCONTINUED | OUTPATIENT
Start: 2019-04-08 | End: 2019-04-12

## 2019-04-08 RX ORDER — SODIUM CHLORIDE 9 MG/ML
INJECTION, SOLUTION INTRAVENOUS CONTINUOUS
Status: DISCONTINUED | OUTPATIENT
Start: 2019-04-08 | End: 2019-04-12

## 2019-04-08 RX ORDER — DICYCLOMINE HYDROCHLORIDE 10 MG/1
10 CAPSULE ORAL EVERY 4 HOURS PRN
Status: DISCONTINUED | OUTPATIENT
Start: 2019-04-08 | End: 2019-04-12

## 2019-04-08 RX ORDER — DIPHENHYDRAMINE HYDROCHLORIDE 12.5 MG/5ML
SOLUTION ORAL
Status: DISCONTINUED | OUTPATIENT
Start: 2019-04-08 | End: 2019-04-09

## 2019-04-08 RX ORDER — ACETAMINOPHEN 325 MG/1
650 TABLET ORAL EVERY 6 HOURS PRN
Status: DISCONTINUED | OUTPATIENT
Start: 2019-04-08 | End: 2019-04-12

## 2019-04-08 RX ORDER — GABAPENTIN 300 MG/1
300 CAPSULE ORAL SEE ADMIN INSTRUCTIONS
Status: DISCONTINUED | OUTPATIENT
Start: 2019-04-08 | End: 2019-04-09 | Stop reason: DRUGHIGH

## 2019-04-08 RX ORDER — FENTANYL 75 UG/H
1 PATCH TRANSDERMAL
Status: DISCONTINUED | OUTPATIENT
Start: 2019-04-08 | End: 2019-04-12

## 2019-04-08 NOTE — ED PROVIDER NOTES
Patient Seen in: [facility] Emergency Department    History   Patient presents with:  Abdomen/Flank Pain (GI/)    Stated Complaint: abd pain    HPI     Patient is a 47-year-old male who has a history of esophageal cancer.   Patient has been through Squamous cell carcinoma, esophagus (HCC) 2/6/2019   • Unspecified essential hypertension    • Visual impairment     reading glasses       Past Surgical History:   Procedure Laterality Date   • CERVICAL EPIDURAL N/A 9/14/2018    Performed by Santos Burt Daniel Ramachandran MD at [facility] ENDOSCOPY   • ESOPHAGOGASTRODUODENOSCOPY (EGD) N/A 4/2/2014    Performed by Daniel Ramachandran MD at [facility] ENDOSCOPY   • ESOPHAGOGASTRODUODENOSCOPY, POSSIBLE BIOPSY, POSSIBLE POLYPECTOMY [address]881 N/A 6/8/2011    Performed by Ashlie Martinez botox injection into the lower esophagus to treat achalasia;  Edjonathan           Social History    Tobacco Use      Smoking status: Former Smoker        Packs/day: 1.00        Years: 25.00        Pack years: 25        Types: Cigarettes        Quit date: 8 Abnormal; Notable for the following components:    WBC 13.6 (*)     RBC 4.02 (*)     HGB 12.4 (*)     HCT 34.9 (*)     Neutrophil Absolute Prelim 12.36 (*)     Neutrophil Absolute 12.36 (*)     Lymphocyte Absolute 0.39 (*)     All other components within n etiology    Disposition:  Admit  4/8/2019 12:41 pm    Follow-up:  No follow-up provider specified.       Medications Prescribed:  Current Discharge Medication List        Present on Admission  Date Reviewed: 3/22/2019          ICD-10-CM Noted POA    * (Prin

## 2019-04-09 PROCEDURE — 85025 COMPLETE CBC W/AUTO DIFF WBC: CPT | Performed by: INTERNAL MEDICINE

## 2019-04-09 PROCEDURE — 80053 COMPREHEN METABOLIC PANEL: CPT | Performed by: INTERNAL MEDICINE

## 2019-04-09 PROCEDURE — 83735 ASSAY OF MAGNESIUM: CPT | Performed by: INTERNAL MEDICINE

## 2019-04-09 RX ORDER — DIPHENHYDRAMINE HCL 25 MG
25 CAPSULE ORAL NIGHTLY PRN
Status: DISCONTINUED | OUTPATIENT
Start: 2019-04-09 | End: 2019-04-12

## 2019-04-09 RX ORDER — POTASSIUM CHLORIDE 20 MEQ/1
40 TABLET, EXTENDED RELEASE ORAL EVERY 4 HOURS
Status: COMPLETED | OUTPATIENT
Start: 2019-04-09 | End: 2019-04-09

## 2019-04-09 RX ORDER — LACTULOSE 20 G/30ML
20 SOLUTION ORAL 3 TIMES DAILY
Status: COMPLETED | OUTPATIENT
Start: 2019-04-09 | End: 2019-04-09

## 2019-04-09 RX ORDER — GABAPENTIN 300 MG/1
300 CAPSULE ORAL EVERY OTHER DAY
Status: DISCONTINUED | OUTPATIENT
Start: 2019-04-09 | End: 2019-04-12

## 2019-04-09 NOTE — PROGRESS NOTES
[facility] Hospitalist Progress Note     PCP: Olivia Clark MD    Chief Complaint: follow-up    Overnight/Interim Events:      SUBJECTIVE:  Pt laying in bed. Reports pain/uncomfortable as having BM, \"water running through him\".  States he \"knows his nicole clonazePAM  0.5 mg Oral BID   • fentaNYL  1 patch Transdermal Q72H   • Senna-Docusate Sodium  1-2 tablet Oral Daily   • enoxaparin  40 mg Subcutaneous Daily   • maalox/diphenhydramine/lidocaine viscous  5 mL Oral TID AC and HS     • sodium chloride 100 mL/

## 2019-04-09 NOTE — PROGRESS NOTES
Pain Service  61 yo with PMHx esophageal cancer admitted with severe abdominal pain. CT abdomen  PROCEDURE:  CT ABDOMEN+PELVIS (CONTRAST ONLY) (CPT=74177)     COMPARISON:  [facility] , CT CHEST (W+WO) (CPT=71270), 2/20/2019, 14:32.   [facility] , CT ABDOMEN+PELVIS esophagus. Small hiatal hernia again noted. There is a moderate to large amount of stool seen throughout the colon. There is some fluid distending the cecum. There are no distended loops of small bowel. No definite free air.   Calcifications of the p

## 2019-04-09 NOTE — H&P
General Medicine H&P     Patient presents with:  Abdomen/Flank Pain (GI/)       PCP: Vinnie Vitale MD    History of Present Illness: Patient is a 62year old male with PMH including but not limited to HTN, anxiety, esophagus ca who p/t [facility] ED c nanda CERVICAL EPIDURAL N/A 8/27/2018    Performed by Daquan Torres MD at [address] [facility] N/A 8/10/2018    Performed by Daquan Torres MD at [address] [facility] N/A 9/6/2017    Performe GASTROSCOPY N/A 10/31/2013    Performed by Susanna Anaya MD at [address] [facility] N/A 11/13/2018    Performed by Melissa Rosas MD at [facility] [address] N/A 6/4/2018    Performed by Valeriy Hollingsworthny mg BID   clonazePAM 0.5 mg BID   fentaNYL 1 patch Q72H   Senna-Docusate Sodium 1-2 tablet Daily   gabapentin 300 mg See Admin Instructions   enoxaparin 40 mg Daily   maalox/diphenhydramine/lidocaine viscous 5 mL TID AC and HS       Social History    Tobacc 2/05/2019, 19:11. [facility] , CT ABDOMEN+PELVIS(CONTRAST ONLY)(CPT=74177), 12/03/2018, 10:11. [facility] , CT ABDOMEN+PELVIS(CONTRAST ONLY)(CPT=74177), 10/02/2018, 19:18. [facility] , CT ABDOMEN+PELVIS(CONTRAST ONLY)(CPT=74177), 9/26/2018, 20:12.   [facility] , CT ABDO pelvis. The left lower quadrant colonic anastomosis  is patent. ABDOMINAL WALL:  No mass or hernia. URINARY BLADDER:  Moderate distention of the bladder without focal mass. PELVIC NODES:  No adenopathy. PELVIC ORGANS:  Mildly enlarged prostate.   Stable documentation in H+P. Based on patients current state of illness, I anticipate that, after discharge, patient will require TBD.

## 2019-04-09 NOTE — CM/SW NOTE
04/09/19 1600   CM/SW Screening   Referral Source    Information Source Nursing rounds   Patient's Mental Status Alert;Oriented   Patient's [address]   Number of Levels in Home 2   Number of Stair in Home 12   Patient lives with

## 2019-04-09 NOTE — PAYOR COMM NOTE
--------------  ADMISSION REVIEW     Payor: [address] [facility]  Subscriber #:  [id number]  Authorization Number: [id number]    Admit date: 4/8/19  Admit time: 3890         Patient Seen in: [facility] Emergency Department    History   Patient pre back and hands   • Perforation of small intestine (HCC)    • Problems with swallowing     Achalasia   • S/P left colectomy 7/21/2009   • Squamous cell carcinoma, esophagus ([facility] 2/6/2019   • Unspecified essential hypertension    • Visual impairment     marge Patient presents with:  Abdomen/Flank Pain (GI/)     History of Present Illness: Patient is a 62year old male with PMH including but not limited to HTN, esophagus ca who p/t [facility] ED c persistent pain. Pt has gone through chemo and XRT.  Surgery att Q72H   Senna-Docusate Sodium 1-2 tablet Daily   gabapentin 300 mg See Admin Instructions   enoxaparin 40 mg Daily   maalox/diphenhydramine/lidocaine viscous 5 mL TID AC and HS     OBJECTIVE:  /79   Pulse (!) 45   Temp 98.9 °F (37.2 °C) (Oral)   Resp Registry. PATIENT STATED HISTORY:(As transcribed by Technologist)  Lower abdominal pain with vomiting   CONTRAST USED:  57cc of Omnipaque 350  FINDINGS:  LIVER:  No enlargement, atrophy, abnormal density, or significant focal lesion.   BILIARY:  No visible with a small hiatal hernia. 4. Moderate distention of the bladder without focal mass. Stable mildly enlarged prostate with stable enlarged seminal vesicles.     Dictated by: Emilie Rogers MD on 4/08/2019 at 9:52     Approved by: Emilie Rogers MD Route     4/9/2019 0813 Given 10 mg Intravenous     4/8/2019 1734 Given 10 mg Intravenous       ondansetron HCl (ZOFRAN) injection 4 mg     Date Action Dose Route     4/9/2019 0537 Given 4 mg Intravenous       OxyCODONE HCl IR (ROXICODONE) immediate releas

## 2019-04-09 NOTE — PLAN OF CARE
Pain rating 7/10 with reluef down to 5/10 after painmeds given;  Zeinab Roberts here now discussing POC; rounded too;alternating dilaudid and roxicodone for pain;lactulose added today for 2 doses;pt. reported 4x liquid stool but not seen;noted 1 speci

## 2019-04-09 NOTE — PLAN OF CARE
Problem: PAIN - ADULT  Goal: Verbalizes/displays adequate comfort level or patient's stated pain goal  Description  INTERVENTIONS:  - Encourage pt to monitor pain and request assistance  - Assess pain using appropriate pain scale  - Administer analgesics and lab values  - Obtain nutritional consult as needed  - Optimize oral hygiene and moisture  - Encourage food from home; allow for food preferences  - Enhance eating environment  Outcome: Progressing    Problem: METABOLIC/FLUID AND ELECTROLYTES - ADULT  G

## 2019-04-09 NOTE — CONSULTS
[facility]  Report of Consultation    Андрей Hale Patient Status:  Inpatient    1965 MRN [id number]   [facility] 4NW-A Attending Jhonatan Bentley MD   Hosp Day # 1 PCP Maylin Latif MD     REASON FOR CONSULTATION: Problems with swallowing     Achalasia   • S/P left colectomy 7/21/2009   • [facility] 2/6/2019   • Unspecified essential hypertension    • Visual impairment     reading glasses     Past Surgical History:   Procedure Lateralit ENDOSCOPY   • ESOPHAGOGASTRODUODENOSCOPY (EGD) N/A 8/5/2014    Performed by Hansel Delgadillo MD at [facility] ENDOSCOPY   • ESOPHAGOGASTRODUODENOSCOPY (EGD) N/A 4/2/2014    Performed by Hansel Delgadillo MD at [facility] ENDOSCOPY   • ESOPHAGOGASTRODUODENOSCOPY, POSSIBLE B esphagus surgery   • TONSILLECTOMY     • UPPER GI ENDOSCOPY,DIAGNOSIS  6/23/16    botox injection into the lower esophagus to treat achalasia; [facility]     Family History   Problem Relation Age of Onset   • Renal Disease Father         kidney failure.    • Al Instructions  •  0.9%  NaCl infusion, , Intravenous, Continuous  •  Enoxaparin Sodium (LOVENOX) 40 MG/0.4ML injection 40 mg, 40 mg, Subcutaneous, Daily  •  acetaminophen (TYLENOL) tab 650 mg, 650 mg, Oral, Q6H PRN  •  ondansetron HCl (ZOFRAN) injection 4 m Creatinine 0.89 0.70 - 1.30 mg/dL    BUN/CREA Ratio 16.9 10.0 - 20.0    Calcium, Total 10.0 8.5 - 10.1 mg/dL    Calculated Osmolality 277 275 - 295 mOsm/kg    GFR, Non- 96 >=60    GFR, -American 110 >=60    AST 16 15 - 37 U/L    A Granulocyte % 0.4 %   COMP METABOLIC PANEL (14)    Collection Time: 04/09/19  5:43 AM   Result Value Ref Range    Glucose 79 70 - 99 mg/dL    Sodium 135 (L) 136 - 145 mmol/L    Potassium 3.6 3.5 - 5.1 mmol/L    Chloride 102 98 - 112 mmol/L    CO2 26.0 21.0 WBC, RBC, and platelet morphology. Imaging:    Ct Abdomen+pelvis(contrast Only)(cpt=74177)    Result Date: 4/8/2019  CONCLUSION:  1. No acute intra-abdominal or pelvic process.  2. Large amount of stool within the right and transverse colon wi follow     Thank you for allowing me to participate in the care of your patient.     Aleks Saavedra MD

## 2019-04-09 NOTE — DIETARY MALNUTRITION NOTE
[facility]    NUTRITION INITIAL ASSESSMENT    Pt meets severe malnutrition criteria.     CRITERIA FOR MALNUTRITION DIAGNOSIS:  Criteria for severe malnutrition diagnosis: chronic illness related to energy intake less than 75% for greater than 1 month, now.      62year old male with PMH including but not limited to HTN, anxiety, esophagus ca who p/t [facility] ED c persistent pain.        ANTHROPOMETRICS:  Ht:  5'6''  Wt: 49.7 kg (109 lb 9.1 oz) . This is 77 % of IBW  BMI:Body mass index is 17.68 kg/m².  .  IBW: mass    MEDICATIONS:  Noted    LABS:  Noted    Pt is at high nutrition risk    FOLLOW-UP DATE:  4/12/19    Diann Newton MA, RD, LDN  Pager [phone]

## 2019-04-10 ENCOUNTER — APPOINTMENT (OUTPATIENT)
Dept: GENERAL RADIOLOGY | Facility: HOSPITAL | Age: 54
DRG: 391 | End: 2019-04-10
Attending: HOSPITALIST
Payer: COMMERCIAL

## 2019-04-10 PROCEDURE — 84132 ASSAY OF SERUM POTASSIUM: CPT | Performed by: INTERNAL MEDICINE

## 2019-04-10 PROCEDURE — 74019 RADEX ABDOMEN 2 VIEWS: CPT | Performed by: HOSPITALIST

## 2019-04-10 RX ORDER — LORAZEPAM 2 MG/ML
1 INJECTION INTRAMUSCULAR EVERY 6 HOURS PRN
Status: DISCONTINUED | OUTPATIENT
Start: 2019-04-10 | End: 2019-04-12

## 2019-04-10 RX ORDER — LORAZEPAM 2 MG/ML
0.5 INJECTION INTRAMUSCULAR EVERY 6 HOURS PRN
Status: DISCONTINUED | OUTPATIENT
Start: 2019-04-10 | End: 2019-04-12

## 2019-04-10 RX ORDER — LORAZEPAM 2 MG/ML
1 INJECTION INTRAMUSCULAR ONCE
Status: COMPLETED | OUTPATIENT
Start: 2019-04-10 | End: 2019-04-10

## 2019-04-10 RX ORDER — POTASSIUM CHLORIDE 20 MEQ/1
40 TABLET, EXTENDED RELEASE ORAL ONCE
Status: DISCONTINUED | OUTPATIENT
Start: 2019-04-10 | End: 2019-04-11 | Stop reason: ALTCHOICE

## 2019-04-10 RX ORDER — KETOROLAC TROMETHAMINE 30 MG/ML
30 INJECTION, SOLUTION INTRAMUSCULAR; INTRAVENOUS EVERY 6 HOURS PRN
Status: DISPENSED | OUTPATIENT
Start: 2019-04-10 | End: 2019-04-12

## 2019-04-10 RX ORDER — HYDRALAZINE HYDROCHLORIDE 25 MG/1
25 TABLET, FILM COATED ORAL EVERY 6 HOURS PRN
Status: DISCONTINUED | OUTPATIENT
Start: 2019-04-10 | End: 2019-04-12

## 2019-04-10 RX ORDER — POTASSIUM CHLORIDE 14.9 MG/ML
20 INJECTION INTRAVENOUS ONCE
Status: COMPLETED | OUTPATIENT
Start: 2019-04-10 | End: 2019-04-10

## 2019-04-10 NOTE — PROGRESS NOTES
[facility] Hospitalist Progress Note     PCP: Clarence Mchugh MD    Chief Complaint: follow-up    SUBJECTIVE:  Pt reportedly very uncomfortable this morning. When I saw him he said that he strangely felt much better and was not having significant pain.  Mor spray Each Nare Daily   • lisinopril  10 mg Oral Daily   • pregabalin  100 mg Oral BID   • clonazePAM  0.5 mg Oral BID   • fentaNYL  1 patch Transdermal Q72H   • Senna-Docusate Sodium  1-2 tablet Oral Daily   • enoxaparin  40 mg Subcutaneous Daily   • xander

## 2019-04-10 NOTE — PROGRESS NOTES
[facility]  Progress Note    Alexandria Lauren Patient Status:  Inpatient    1965 MRN [id number]   [facility] 4NW-A Attending Orville Anderson MD   Hosp Day # 2 PCP Ada Yang MD     Subjective:    No solid stools but li    Thank you for allowing me to participate in the care of your patient.  Rodney Méndez MD

## 2019-04-10 NOTE — PROGRESS NOTES
C/o pain and nausea, no emesis noted, medicated for c/o both. Agitated and irritable this shift, states doesn't understand why there's a time frame on medications and why he can't have them when he needs them.  No bowel movement noted tonight, continues on

## 2019-04-10 NOTE — PLAN OF CARE
Problem: PAIN - ADULT  Goal: Verbalizes/displays adequate comfort level or patient's stated pain goal  Description  INTERVENTIONS:  - Encourage pt to monitor pain and request assistance  - Assess pain using appropriate pain scale  - Administer analgesics and lab values  - Obtain nutritional consult as needed  - Optimize oral hygiene and moisture  - Encourage food from home; allow for food preferences  - Enhance eating environment  Outcome: Progressing     Problem: METABOLIC/FLUID AND ELECTROLYTES - ADULT

## 2019-04-10 NOTE — PROGRESS NOTES
Pain Service    Spoke with Dr. Suma Franklin, Chronic Pain MD last night    Appointment made for outpatient pain management for evaluation for potential intrathecal pain pump, spinal cord stimulator, or other modality for pain control     Thursday April

## 2019-04-11 PROCEDURE — 84132 ASSAY OF SERUM POTASSIUM: CPT | Performed by: INTERNAL MEDICINE

## 2019-04-11 RX ORDER — POTASSIUM CHLORIDE 20 MEQ/1
40 TABLET, EXTENDED RELEASE ORAL EVERY 4 HOURS
Status: COMPLETED | OUTPATIENT
Start: 2019-04-11 | End: 2019-04-11

## 2019-04-11 NOTE — PROGRESS NOTES
[facility] Hospitalist Progress Note     PCP: Gordo Swan MD    Chief Complaint: follow-up    SUBJECTIVE:  Feels okay during my visit. However, he has been very uncomfortable, nauseated, and not eating most of the rest of the time.  Had 2 BMs yesterday, s Daily   • Fluticasone Propionate  2 spray Each Nare Daily   • lisinopril  10 mg Oral Daily   • pregabalin  100 mg Oral BID   • clonazePAM  0.5 mg Oral BID   • fentaNYL  1 patch Transdermal Q72H   • Senna-Docusate Sodium  1-2 tablet Oral Daily   • enoxapari

## 2019-04-11 NOTE — PROGRESS NOTES
[facility]  Progress Note    Alissan Bile Patient Status:  Inpatient    1965 MRN [id number]   [facility] 4NW-A Attending Perla Mandujano MD   [facility] Day [facility] 3 PCP Maira Isabel Tavares MD     Subjective:    Pain is intermittent AC and HS         ASSESSMENT AND PLAN:     Mariel Marvin is a 62year old male with      1. Esophageal cancer - inoperable T4N1 lesion. This would require further treatment but as out patient.      2.  Abdominal pain  Xray showed normal bowel pattern  P

## 2019-04-11 NOTE — PLAN OF CARE
Pt received alert and oriented, complaining of abdominal pain. Prn medication given, upon reassessment pt noted to be asleep. Currently resting quietly, frequent rounding done.    Problem: PAIN - ADULT  Goal: Verbalizes/displays adequate comfort level or

## 2019-04-11 NOTE — CONSULTS
[facility]  Report of Consultation    Daljit Goldmann Patient Status:  Inpatient    1965 MRN [id number]   [facility] 4NW-A Attending Jono Jacome MD   Hosp Day # 3 PCP Chloe Mcgee MD     Reason for Consultation:  abd pain, vomiting not specified, unspecified vomiting type     Abdominal pain of unknown etiology        History:  Past Medical History:   Diagnosis Date   • Achalasia 1997    achalasia - s/p heller myotomy with revision in 2004   • Anxiety state, unspecified    • anastamosis   • COLONOSCOPY  11/2012    diverticulosis and sigmoid anastomosis   • ENDOSCOPIC ULTRASOUND (EUS) N/A 2/19/2019    Performed by Chi Oliver MD at [address] [place] [address] (EUS) N/A 11/5/2018    Performed by Chi Oliver, PAIN MANAGEMENT   • LUMBAR EPIDURAL N/A 4/11/2018    Performed by Korey Giles MD at [address] [facility] N/A 6/15/2017    Performed by Korey Giles MD at [address] [place] [address] N/A 5/31/2 Known Allergies    Medications:    Current Facility-Administered Medications:  ketorolac tromethamine (TORADOL) 30 MG/ML injection 30 mg 30 mg Intravenous Q6H PRN   LORazepam (ATIVAN) injection 0.5 mg 0.5 mg Intravenous Q6H PRN   Or      LORazepam (ATIVAN) medications on file prior to encounter. Current Outpatient Medications on File Prior to Encounter:  Sennosides-Docusate Sodium 8.6-50 MG Oral Tab Take 1-2 tablets by mouth daily.  While on narcotic pain medication stop for diarrhea Disp:  Rfl:    gabapent Naloxegol Oxalate (MOVANTIK) 25 MG Oral Tab Take 25 mg by mouth daily. Disp:  Rfl:    lidocaine-prilocaine (EMLA) 2.5-2.5 % External Cream Apply 1 Application topically as needed (prior to treatment).  Disp: 30 g Rfl: 3   Dicyclomine HCl 10 MG Oral Cap 10 edema, peripheral pulses intact  NEURO:  Oriented x 3   BACK: no CVA tenderness  PSYCH: appropriate for the exam          LAB/IMAGING RESULTS:        Recent Labs   Lab 04/08/19  0749 04/09/19  0543 04/10/19  0621 04/11/19  0453   * 79  --   --    BU Omnipaque 350     FINDINGS:    LIVER:  No enlargement, atrophy, abnormal density, or significant focal lesion. BILIARY:  No visible dilatation or calcification. PANCREAS:  No lesion, fluid collection, ductal dilatation, or atrophy.     SPLEEN:  No enl Stable mildly enlarged prostate with stable enlarged seminal vesicles. 4/10/19     TECHNIQUE:  2 view obstructive series of the abdomen and pelvis were obtained. COMPARISON:  [facility], 5/16/2010, 4:56.      INDICATIONS:  pain from 34 cm from incisors down to 39 cm from incisors. There was questionable fistula opening in the proximal aspect of the tumor. The rest of the upper endoscopic examination is overall unremarkable.   The gastric examination was overall unremarkable incl Rosi as outpt, can f/u w /them if needed as well    Dr Manny Miranda and I discussed his care today as well    Will sign off, pls call w/ questions     The patient indicates understanding of these issues and agrees to the plan.     Melissa Gil  4/11/2019  3

## 2019-04-11 NOTE — PROGRESS NOTES
Little relief from Dilaudid 1 mg and Oxy IR. Medicated w/dilaudid 2 mg with much better results. Now talking, smiling, sitting up in bed. No nausea currently. Ate small amt pasta. Calorie count in progress.

## 2019-04-12 VITALS
BODY MASS INDEX: 18 KG/M2 | OXYGEN SATURATION: 96 % | SYSTOLIC BLOOD PRESSURE: 139 MMHG | TEMPERATURE: 98 F | HEART RATE: 50 BPM | WEIGHT: 109.56 LBS | DIASTOLIC BLOOD PRESSURE: 85 MMHG | RESPIRATION RATE: 18 BRPM

## 2019-04-12 PROCEDURE — 84132 ASSAY OF SERUM POTASSIUM: CPT | Performed by: HOSPITALIST

## 2019-04-12 RX ORDER — HYDROMORPHONE HYDROCHLORIDE 4 MG/1
4 TABLET ORAL EVERY 4 HOURS PRN
Qty: 30 TABLET | Refills: 0 | Status: SHIPPED | OUTPATIENT
Start: 2019-04-12 | End: 2019-04-22

## 2019-04-12 RX ORDER — ONDANSETRON HYDROCHLORIDE 8 MG/1
8 TABLET, FILM COATED ORAL EVERY 8 HOURS PRN
Qty: 30 TABLET | Refills: 3 | Status: SHIPPED | OUTPATIENT
Start: 2019-04-12 | End: 2020-09-10

## 2019-04-12 NOTE — PROGRESS NOTES
[facility]  Progress Note    Nhi Gunderson Patient Status:  Inpatient    1965 MRN [id number]   [facility] 4NW-A Attending Altaf Park MD   [facility] Day # 4 PCP Oma Montanez MD     Subjective:    He is better this am

## 2019-04-12 NOTE — DIETARY NOTE
[facility]    NUTRITION FOLLOW UP ASSESSMENT    Pt meets severe malnutrition criteria.     CRITERIA FOR MALNUTRITION DIAGNOSIS:  Criteria for severe malnutrition diagnosis: chronic illness related to energy intake less than 75% for greater than 1 month some food items. Pt says he drinks protein drink at home but cannot recall the name at this time. Agreeable to ensure supplement, would prefer ensure high protein because he does not like the consistency of ensure enlive as it is \"too thick\" for him.  Lik 9012-7236 calories/day (33-35 calories per kg)  Protein: 65-75 grams protein/day (1.3-1.5 grams protein per kg)  Fluid: ~1 ml/kcal or per MD discretion    MONITOR AND EVALUATE/NUTRITION GOALS:  1. PO intake to meet at least 75% patient nutrition prescripti

## 2019-04-12 NOTE — PROGRESS NOTES
Alert and oriented x 4, v.s.s., medicated for c/o pain and nausea frequently thru the shift, no emesis noted. Continues IVF, ambulatory in room independently with steady gait. Appetite remains poor, calorie count in place until 4/12.  No distress noted, rios

## 2019-04-15 PROBLEM — C15.9 MALIGNANT NEOPLASM OF ESOPHAGUS (HCC): Status: ACTIVE | Noted: 2019-03-26

## 2019-04-16 NOTE — CDS QUERY
Potential for Impaired Nutritional Status  Bhupinder Sharma  Dear Dr. Thiago Haynes:  Clinical information documented by the Clinical Dietician suggests potential for impaired nutritional status.  For accurate ICD-10-CM code assignment to reflect se

## 2019-04-18 NOTE — DISCHARGE SUMMARY
[facility] Discharge Summary    Patient ID:  Shaila Carter  [id number]  62year old  1/30/1965    Admit date: 4/8/2019  Discharge date and time: 4/12/19  Attending Physician: Devyn Mancuso MD  Primary Care Physician: Ginny Kate MD toradol and some IV dilaudid prn. Ultimately, he did receive a short course of oral dilaudid prn on d/c to help bridge him to his follow-up appointments.      Day of discharge Exam   04/12/19  0348   BP: 139/85   Pulse: 50   Resp: 18   Temp: 98.2 °F (36.8 ° daily as needed for sore throat.      * Benadryl/Maalox/Lidocaine 1:1:1 solution  Take 5-10 mL by mouth TID AC&HS. [address]-63     MOVANTIK 25 MG Tabs  Generic drug:  Naloxegol Oxalate     * OxyCODONE HCl IR 15 MG Tabs  Commonly known as:  Connie Frey CT scanning was performed from the dome of the diaphragm to the pubic symphysis with non-ionic intravenous contrast material. Post contrast coronal MPR imaging was performed. Dose reduction techniques were used.  Dose information is transmitted to the ACR pulmonary or pleural disease. Stable chronic scarring in the lung bases and medial aspect of the right middle lobe. Mild hyperinflation. OTHER:  Negative. CONCLUSION:  1. No acute intra-abdominal or pelvic process.  2. Large amount of stool within

## 2019-05-02 PROBLEM — G89.3 CANCER RELATED PAIN: Status: ACTIVE | Noted: 2019-05-02

## 2019-05-02 PROBLEM — E87.6 HYPOKALEMIA: Status: RESOLVED | Noted: 2019-04-08 | Resolved: 2019-05-02

## 2019-05-02 PROBLEM — R11.2 NAUSEA AND VOMITING, INTRACTABILITY OF VOMITING NOT SPECIFIED, UNSPECIFIED VOMITING TYPE: Status: RESOLVED | Noted: 2019-04-08 | Resolved: 2019-05-02

## 2019-05-02 PROBLEM — E87.1 HYPONATREMIA: Status: RESOLVED | Noted: 2019-04-08 | Resolved: 2019-05-02

## 2019-05-14 PROBLEM — H04.123 DRY EYE SYNDROME OF BILATERAL LACRIMAL GLANDS: Status: ACTIVE | Noted: 2019-05-13

## 2019-05-16 ENCOUNTER — APPOINTMENT (OUTPATIENT)
Dept: CT IMAGING | Facility: HOSPITAL | Age: 54
DRG: 375 | End: 2019-05-16
Attending: EMERGENCY MEDICINE
Payer: COMMERCIAL

## 2019-05-16 ENCOUNTER — HOSPITAL ENCOUNTER (INPATIENT)
Facility: HOSPITAL | Age: 54
LOS: 1 days | Discharge: HOME | DRG: 375 | End: 2019-05-17
Attending: EMERGENCY MEDICINE | Admitting: HOSPITALIST
Payer: COMMERCIAL

## 2019-05-16 DIAGNOSIS — M51.36 DDD (DEGENERATIVE DISC DISEASE), LUMBAR: ICD-10-CM

## 2019-05-16 DIAGNOSIS — K22.0 ACHALASIA: ICD-10-CM

## 2019-05-16 DIAGNOSIS — Z51.5 PALLIATIVE CARE ENCOUNTER: ICD-10-CM

## 2019-05-16 DIAGNOSIS — C15.8 MALIGNANT NEOPLASM OF OVERLAPPING SITES OF ESOPHAGUS (HCC): ICD-10-CM

## 2019-05-16 DIAGNOSIS — R10.13 ABDOMINAL PAIN, EPIGASTRIC: ICD-10-CM

## 2019-05-16 DIAGNOSIS — Z71.89 GOALS OF CARE, COUNSELING/DISCUSSION: ICD-10-CM

## 2019-05-16 DIAGNOSIS — R10.9 INTRACTABLE ABDOMINAL PAIN: Primary | ICD-10-CM

## 2019-05-16 PROCEDURE — 74176 CT ABD & PELVIS W/O CONTRAST: CPT | Performed by: EMERGENCY MEDICINE

## 2019-05-16 RX ORDER — MORPHINE SULFATE 10 MG/ML
10 INJECTION, SOLUTION INTRAMUSCULAR; INTRAVENOUS ONCE
Status: COMPLETED | OUTPATIENT
Start: 2019-05-16 | End: 2019-05-16

## 2019-05-16 RX ORDER — HYDROMORPHONE HYDROCHLORIDE 1 MG/ML
0.5 INJECTION, SOLUTION INTRAMUSCULAR; INTRAVENOUS; SUBCUTANEOUS ONCE
Status: COMPLETED | OUTPATIENT
Start: 2019-05-16 | End: 2019-05-16

## 2019-05-16 RX ORDER — ONDANSETRON 2 MG/ML
4 INJECTION INTRAMUSCULAR; INTRAVENOUS ONCE
Status: COMPLETED | OUTPATIENT
Start: 2019-05-16 | End: 2019-05-16

## 2019-05-16 NOTE — ED INITIAL ASSESSMENT (HPI)
Pt states chronic abd pain. Pt with diverticulitis and bowel resections. Pt currently being treated for CA. Pt with dry heaves. Pt denies fevers or changes in urinary patterns.

## 2019-05-17 VITALS
OXYGEN SATURATION: 99 % | HEART RATE: 53 BPM | RESPIRATION RATE: 18 BRPM | DIASTOLIC BLOOD PRESSURE: 84 MMHG | HEIGHT: 66 IN | TEMPERATURE: 98 F | WEIGHT: 101 LBS | SYSTOLIC BLOOD PRESSURE: 117 MMHG | BODY MASS INDEX: 16.23 KG/M2

## 2019-05-17 PROBLEM — Z51.5 PALLIATIVE CARE ENCOUNTER: Status: ACTIVE | Noted: 2019-05-17

## 2019-05-17 PROBLEM — Z71.89 GOALS OF CARE, COUNSELING/DISCUSSION: Status: ACTIVE | Noted: 2019-05-17

## 2019-05-17 PROCEDURE — 99252 IP/OBS CONSLTJ NEW/EST SF 35: CPT | Performed by: CLINICAL NURSE SPECIALIST

## 2019-05-17 RX ORDER — DICYCLOMINE HCL 20 MG
20 TABLET ORAL 4 TIMES DAILY PRN
Qty: 90 TABLET | Refills: 1 | Status: SHIPPED | OUTPATIENT
Start: 2019-05-17 | End: 2020-10-13 | Stop reason: ALTCHOICE

## 2019-05-17 RX ORDER — DIPHENHYDRAMINE HYDROCHLORIDE 12.5 MG/5ML
25 SOLUTION ORAL 4 TIMES DAILY PRN
Status: DISCONTINUED | OUTPATIENT
Start: 2019-05-17 | End: 2019-05-17

## 2019-05-17 RX ORDER — GABAPENTIN 300 MG/1
300 CAPSULE ORAL SEE ADMIN INSTRUCTIONS
Status: DISCONTINUED | OUTPATIENT
Start: 2019-05-17 | End: 2019-05-17

## 2019-05-17 RX ORDER — DIPHENHYDRAMINE HYDROCHLORIDE 12.5 MG/5ML
25 SOLUTION ORAL
Status: DISCONTINUED | OUTPATIENT
Start: 2019-05-17 | End: 2019-05-17

## 2019-05-17 RX ORDER — SODIUM CHLORIDE 9 MG/ML
INJECTION, SOLUTION INTRAVENOUS CONTINUOUS
Status: ACTIVE | OUTPATIENT
Start: 2019-05-17 | End: 2019-05-17

## 2019-05-17 RX ORDER — MULTIVITAMIN/IRON/FOLIC ACID 18MG-0.4MG
250 TABLET ORAL 4 TIMES DAILY
Status: DISCONTINUED | OUTPATIENT
Start: 2019-05-17 | End: 2019-05-17

## 2019-05-17 RX ORDER — DIPHENHYDRAMINE HYDROCHLORIDE 12.5 MG/5ML
12.5 SOLUTION ORAL
Status: DISCONTINUED | OUTPATIENT
Start: 2019-05-17 | End: 2019-05-17

## 2019-05-17 RX ORDER — FLUTICASONE PROPIONATE 50 MCG
2 SPRAY, SUSPENSION (ML) NASAL DAILY
Status: DISCONTINUED | OUTPATIENT
Start: 2019-05-17 | End: 2019-05-17

## 2019-05-17 RX ORDER — SENNA AND DOCUSATE SODIUM 50; 8.6 MG/1; MG/1
1-2 TABLET, FILM COATED ORAL DAILY
Status: DISCONTINUED | OUTPATIENT
Start: 2019-05-17 | End: 2019-05-17

## 2019-05-17 RX ORDER — HYDROMORPHONE HYDROCHLORIDE 2 MG/1
2 TABLET ORAL EVERY 4 HOURS PRN
Status: DISCONTINUED | OUTPATIENT
Start: 2019-05-17 | End: 2019-05-17

## 2019-05-17 RX ORDER — PREGABALIN 100 MG/1
100 CAPSULE ORAL 2 TIMES DAILY
Status: DISCONTINUED | OUTPATIENT
Start: 2019-05-17 | End: 2019-05-17

## 2019-05-17 RX ORDER — DIPHENHYDRAMINE HYDROCHLORIDE 12.5 MG/5ML
12.5 SOLUTION ORAL 4 TIMES DAILY PRN
Status: DISCONTINUED | OUTPATIENT
Start: 2019-05-17 | End: 2019-05-17

## 2019-05-17 RX ORDER — SENNA AND DOCUSATE SODIUM 50; 8.6 MG/1; MG/1
2 TABLET, FILM COATED ORAL 2 TIMES DAILY
Status: DISCONTINUED | OUTPATIENT
Start: 2019-05-17 | End: 2019-05-17

## 2019-05-17 RX ORDER — LISINOPRIL 10 MG/1
10 TABLET ORAL DAILY
Status: DISCONTINUED | OUTPATIENT
Start: 2019-05-17 | End: 2019-05-17

## 2019-05-17 RX ORDER — CYCLOBENZAPRINE HCL 10 MG
10 TABLET ORAL 3 TIMES DAILY PRN
Status: DISCONTINUED | OUTPATIENT
Start: 2019-05-17 | End: 2019-05-17

## 2019-05-17 RX ORDER — DICYCLOMINE HYDROCHLORIDE 10 MG/1
20 CAPSULE ORAL 4 TIMES DAILY
Status: DISCONTINUED | OUTPATIENT
Start: 2019-05-17 | End: 2019-05-17

## 2019-05-17 RX ORDER — MORPHINE SULFATE 4 MG/ML
4 INJECTION, SOLUTION INTRAMUSCULAR; INTRAVENOUS EVERY 30 MIN PRN
Status: DISCONTINUED | OUTPATIENT
Start: 2019-05-17 | End: 2019-05-17

## 2019-05-17 RX ORDER — FENTANYL 100 UG/H
1 PATCH TRANSDERMAL
Status: DISCONTINUED | OUTPATIENT
Start: 2019-05-17 | End: 2019-05-17

## 2019-05-17 RX ORDER — PANTOPRAZOLE SODIUM 40 MG/1
40 TABLET, DELAYED RELEASE ORAL 2 TIMES DAILY
Status: DISCONTINUED | OUTPATIENT
Start: 2019-05-17 | End: 2019-05-17

## 2019-05-17 RX ORDER — CLONAZEPAM 0.5 MG/1
0.5 TABLET ORAL 2 TIMES DAILY
Status: DISCONTINUED | OUTPATIENT
Start: 2019-05-17 | End: 2019-05-17

## 2019-05-17 RX ORDER — ONDANSETRON 4 MG/1
8 TABLET, FILM COATED ORAL EVERY 8 HOURS PRN
Status: DISCONTINUED | OUTPATIENT
Start: 2019-05-17 | End: 2019-05-17

## 2019-05-17 RX ORDER — POLYETHYLENE GLYCOL 3350 17 G/17G
17 POWDER, FOR SOLUTION ORAL DAILY PRN
Status: DISCONTINUED | OUTPATIENT
Start: 2019-05-17 | End: 2019-05-17

## 2019-05-17 RX ORDER — MORPHINE SULFATE 4 MG/ML
4 INJECTION, SOLUTION INTRAMUSCULAR; INTRAVENOUS EVERY 2 HOUR PRN
Status: DISCONTINUED | OUTPATIENT
Start: 2019-05-17 | End: 2019-05-17

## 2019-05-17 RX ORDER — PROCHLORPERAZINE MALEATE 10 MG
10 TABLET ORAL EVERY 6 HOURS PRN
Status: DISCONTINUED | OUTPATIENT
Start: 2019-05-17 | End: 2019-05-17

## 2019-05-17 RX ORDER — ENOXAPARIN SODIUM 100 MG/ML
40 INJECTION SUBCUTANEOUS DAILY
Status: DISCONTINUED | OUTPATIENT
Start: 2019-05-17 | End: 2019-05-17

## 2019-05-17 RX ORDER — SODIUM CHLORIDE 9 MG/ML
INJECTION, SOLUTION INTRAVENOUS CONTINUOUS
Status: DISCONTINUED | OUTPATIENT
Start: 2019-05-17 | End: 2019-05-17

## 2019-05-17 NOTE — DIETARY NOTE
[facility]    NUTRITION INITIAL ASSESSMENT    Pt does not meet malnutrition criteria.     NUTRITION DIAGNOSIS/PROBLEM:    Increased nutrient needs related to increased demands of catabolic illness as evidenced by metastatic CA    NUTRITION INTERVENTION GOALS:    1. PO intake to meet at least 75% patient nutrition prescription  3. No signs of skin breakdown  4.  Maintain lean body mass    MEDICATIONS:  Protonix, senna, IVF    LABS:  Na 132    Pt is at moderate nutrition risk    FOLLOW-UP DATE: 5/22  Lauren Quinteros

## 2019-05-17 NOTE — PROGRESS NOTES
Pain Service  Consult for abdominal pain. Per RN pt refuses to see pain service stating he has a \"plan that works for him\" and does not want pain service consult. Dr. Ada Lopez notified and will d/c consult to pain service.     Edgard Villalta RN

## 2019-05-17 NOTE — CM/SW NOTE
05/17/19 1000   CM/SW Screening   Referral [facility] staff; Chart review;Nursing rounds   Patient's Mental Status Alert;Oriented   Patient's [address]   Patient lives with Spouse   Patient Status Prior to A

## 2019-05-17 NOTE — CONSULTS
[facility]  [id number]  Hospital Day #0  Date of Consult: 05/17/19  Patient seen at: [facility]    Reason for Consultation:      Consult requested by Dr. Renate Contreras for evaluation of palli revision in 2004   • Anxiety state, unspecified    • Back problem     low back   • Cancer [facility])    • Cataract     right eye   • Chronic pain syndrome    • Chronic right-sided thoracic back pain 8/8/2017   • Depression    • Diverticulosis 4/12/2010   • Dive 11/5/2018    Performed by Jed Sen MD at [facility] ENDOSCOPY   • ESOPHAGOGASTRODUODENOSCOPY (EGD) N/A 2/19/2019    Performed by Jed Sen MD at [facility] ENDOSCOPY   • ESOPHAGOGASTRODUODENOSCOPY (EGD) N/A 11/5/2018    Performed by Jed Sen MD at  MANAGEMENT   • LUMBAR EPIDURAL N/A 5/31/2017    Performed by Robert De Oliveira MD at [address] [place] [address] 5/16/2017    Performed by Robert De Oliveira MD at [address] [place] [address] 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      Current Facility-Administered Medications:  clonazePAM (KL Chemistry:  Lab Results   Component Value Date    CREATSERUM 0.96 05/16/2019    BUN 8 05/16/2019     (L) 05/16/2019    K 4.0 05/16/2019    CL 99 05/16/2019    CO2 28.0 05/16/2019     (H) 05/16/2019    CA 9.8 05/16/2019    ALB 4.1 05/16/2 No disease  Normal Full Normal Full   80 Full Some disease  Normal w/effort Full Normal or  Reduced Full   70 Reduced Some disease  Can’t perform job Full Normal or   Reduced Full   60 Reduced Significant disease  Can’t perform hobby Occasional  Assist Nor this time. I encouraged him to keep an open mind about it for the future. Overall he reports the current regimen is controlling his pain. I offered to add a muscle relaxant for his spasms and he was agreeable to try.  Dr. Cande Arango noted that he has been rece [phone]       Psychosocial: Emotional support provided. Spiritual needs addressed: Unable to assess    I provided the Palliative Care Brochure and my contact information.       Disposition: home with follow up with OP Palliative Care at [facility] Trial of flexeril here, Dr. Kirill Maciel to re-evaluate effectiveness and decide on small Rx for discharge.    5. Constipation: Continue home regimen of naloxegol and magnesium    A total of 50 minutes were spent on this consult; >50% was spent counseling and

## 2019-05-17 NOTE — ED PROVIDER NOTES
Patient Seen in: [facility] Emergency Department    History   Patient presents with:  Nausea/Vomiting/Diarrhea (gastrointestinal)    Stated Complaint: abdominal pain    HPI    Patient is a pleasant 41-year-old male, with history of esophageal cancer ([facility] 2/6/2019   • Unspecified essential hypertension    • Visual impairment     reading glasses       Past Surgical History:   Procedure Laterality Date   • CERVICAL EPIDURAL N/A 9/14/2018    Performed by Pooja Corey MD at [address] [place] [address] • ESOPHAGOGASTRODUODENOSCOPY (EGD) N/A 4/2/2014    Performed by Fadi Bailey MD at [facility] ENDOSCOPY   • ESOPHAGOGASTRODUODENOSCOPY, POSSIBLE BIOPSY, POSSIBLE POLYPECTOMY [address] N/A 6/8/2011    Performed by Fadi Bailey MD at [address] [place] [address] esophagus to treat achalasia;  Edward           Social History    Tobacco Use      Smoking status: Former Smoker        Packs/day: 1.00        Years: 25.00        Pack years: 25        Types: Cigarettes        Quit date: 10/17/2018        Years since [employer] components:       Result Value    Glucose 130 (*)     Sodium 132 (*)     BUN/CREA Ratio 8.3 (*)     Calculated Osmolality 274 (*)     All other components within normal limits   CBC W/ DIFFERENTIAL - Abnormal; Notable for the following components:    WBC 1 Nonspecific peripheral calcifications again noted BILIARY:  Unremarkable. SPLEEN:  Unremarkable. PANCREAS:  Unremarkable. ADRENALS:  Unremarkable. KIDNEYS:  No urinary calculi or obstructive uropathy.   Concentrated urine along some of the renal pyramids no Impression:  Intractable abdominal pain  (primary encounter diagnosis)    Disposition:  Admit  5/16/2019 10:49 pm    Follow-up:  No follow-up provider specified.       Medications Prescribed:  Current Discharge Medication List        Present on Admission  D

## 2019-05-17 NOTE — PROGRESS NOTES
0148  Report received from [facility], [address]  Pt.  Is refusing a skin check and Lovenox

## 2019-05-17 NOTE — PROGRESS NOTES
NURSING ADMISSION NOTE      Patient admitted via 3612   Oriented to room. Safety precautions initiated. Bed in low position.   Call light in reach

## 2019-05-17 NOTE — H&P
ABDULKADIR Hospitalist H&P       CC: Patient presents with:  Nausea/Vomiting/Diarrhea (gastrointestinal)       PCP: Karsten Cool MD    History of Present Illness:  Patient is a 62year old male with PMH sig for esophageal CA, chronic pain syndrome, depres back and hands   • Perforation of small intestine (HCC)    • Problems with swallowing     Achalasia   • S/P left colectomy 7/21/2009   • Squamous cell carcinoma, esophagus ([address] 2/6/2019   • Unspecified essential hypertension    • Visual impairment ESOPHAGOGASTRODUODENOSCOPY (EGD) N/A 12/19/2014    Performed by Flavio Leone MD at [facility] ENDOSCOPY   • ESOPHAGOGASTRODUODENOSCOPY (EGD) N/A 8/5/2014    Performed by Flavio Leone MD at [facility] ENDOSCOPY   • ESOPHAGOGASTRODUODENOSCOPY (EGD) N/A 4/2/2014    P • SPECIAL SERVICE OR REPORT      colon resection   • SPECIAL SERVICE OR REPORT      esphagus surgery   • TONSILLECTOMY     • UPPER GI ENDOSCOPY,DIAGNOSIS  6/23/16    botox injection into the lower esophagus to treat achalasia;  Bertin        ALL:  No Know po every 6 hours as needed Disp: 90 capsule Rfl: 3       Scheduled Medication:  • clonazePAM  0.5 mg Oral BID   • Dicyclomine HCl  20 mg Oral QID   • fentaNYL  1 patch Transdermal Q72H   • Fluticasone Propionate  2 spray Each Nare Daily   • gabapentin  300 rashes/lesions  Psych: normal mood/affect      Diagnostic Data:    CBC/Chem  Recent Labs   Lab 05/16/19 1951   WBC 11.4*   HGB 13.7   MCV 86.4   .0       Recent Labs   Lab 05/16/19 1951   *   K 4.0   CL 99   CO2 28.0   BUN 8   CREATSERUM 0.9 Unremarkable urinary bladder. LYMPH NODES:  No significant lymphadenopathy identified in the abdomen or pelvis. BONES:  Degenerative changes in the spine and hips. OTHER:  None. CONCLUSION:   1. No acute intra-abdominal/pelvic process identified.   2. and tumor was deemed unresectable due to direct invasion into the aorta. - I d/w patient the possibilty that his abdominal pain is related to the cancer.  I d/w patient my recommendation for additional evaluation with the oncology service and/or GI servic

## 2019-05-17 NOTE — PLAN OF CARE
A&Ox4 Room Air  Pt is in bed watching TV  Meds given per Mar  Safety precaution maintained  Will continue to monitor

## 2019-05-17 NOTE — PLAN OF CARE
NURSING DISCHARGE NOTE    Discharged Home via Ambulatory. Accompanied by Support staff  Belongings Taken by patient/family. IV removed, discharge instructions given to patient, all pertinent questions and concerns addressed.

## 2019-05-18 ENCOUNTER — HOSPITAL ENCOUNTER (OUTPATIENT)
Facility: HOSPITAL | Age: 54
Setting detail: OBSERVATION
Discharge: HOME | End: 2019-05-19
Attending: EMERGENCY MEDICINE | Admitting: HOSPITALIST
Payer: COMMERCIAL

## 2019-05-18 DIAGNOSIS — R10.9 ABDOMINAL PAIN, ACUTE: Primary | ICD-10-CM

## 2019-05-18 DIAGNOSIS — C15.9 MALIGNANT NEOPLASM OF ESOPHAGUS, UNSPECIFIED LOCATION (HCC): ICD-10-CM

## 2019-05-18 PROCEDURE — 85025 COMPLETE CBC W/AUTO DIFF WBC: CPT | Performed by: EMERGENCY MEDICINE

## 2019-05-18 PROCEDURE — 96375 TX/PRO/DX INJ NEW DRUG ADDON: CPT

## 2019-05-18 PROCEDURE — 99285 EMERGENCY DEPT VISIT HI MDM: CPT

## 2019-05-18 PROCEDURE — 96374 THER/PROPH/DIAG INJ IV PUSH: CPT

## 2019-05-18 PROCEDURE — 96361 HYDRATE IV INFUSION ADD-ON: CPT

## 2019-05-18 PROCEDURE — 96376 TX/PRO/DX INJ SAME DRUG ADON: CPT

## 2019-05-18 PROCEDURE — 81003 URINALYSIS AUTO W/O SCOPE: CPT | Performed by: EMERGENCY MEDICINE

## 2019-05-18 PROCEDURE — 83690 ASSAY OF LIPASE: CPT | Performed by: EMERGENCY MEDICINE

## 2019-05-18 PROCEDURE — 80053 COMPREHEN METABOLIC PANEL: CPT | Performed by: EMERGENCY MEDICINE

## 2019-05-18 RX ORDER — ONDANSETRON 2 MG/ML
4 INJECTION INTRAMUSCULAR; INTRAVENOUS EVERY 6 HOURS PRN
Status: DISCONTINUED | OUTPATIENT
Start: 2019-05-18 | End: 2019-05-19

## 2019-05-18 RX ORDER — ENOXAPARIN SODIUM 100 MG/ML
40 INJECTION SUBCUTANEOUS DAILY
Status: DISCONTINUED | OUTPATIENT
Start: 2019-05-18 | End: 2019-05-19

## 2019-05-18 RX ORDER — ONDANSETRON 2 MG/ML
4 INJECTION INTRAMUSCULAR; INTRAVENOUS ONCE
Status: COMPLETED | OUTPATIENT
Start: 2019-05-18 | End: 2019-05-18

## 2019-05-18 RX ORDER — MORPHINE SULFATE 10 MG/ML
10 INJECTION, SOLUTION INTRAMUSCULAR; INTRAVENOUS
Status: COMPLETED | OUTPATIENT
Start: 2019-05-18 | End: 2019-05-18

## 2019-05-18 RX ORDER — SODIUM PHOSPHATE, DIBASIC AND SODIUM PHOSPHATE, MONOBASIC 7; 19 G/133ML; G/133ML
1 ENEMA RECTAL ONCE AS NEEDED
Status: DISCONTINUED | OUTPATIENT
Start: 2019-05-18 | End: 2019-05-19

## 2019-05-18 RX ORDER — MORPHINE SULFATE 10 MG/ML
INJECTION, SOLUTION INTRAMUSCULAR; INTRAVENOUS
Status: DISPENSED
Start: 2019-05-18 | End: 2019-05-19

## 2019-05-18 RX ORDER — GABAPENTIN 300 MG/1
300 CAPSULE ORAL NIGHTLY
Status: DISCONTINUED | OUTPATIENT
Start: 2019-05-18 | End: 2019-05-18

## 2019-05-18 RX ORDER — MORPHINE SULFATE 10 MG/ML
10 INJECTION, SOLUTION INTRAMUSCULAR; INTRAVENOUS ONCE
Status: COMPLETED | OUTPATIENT
Start: 2019-05-18 | End: 2019-05-18

## 2019-05-18 RX ORDER — DICYCLOMINE HCL 20 MG
20 TABLET ORAL
Status: DISCONTINUED | OUTPATIENT
Start: 2019-05-18 | End: 2019-05-19

## 2019-05-18 RX ORDER — SODIUM CHLORIDE 9 MG/ML
INJECTION, SOLUTION INTRAVENOUS CONTINUOUS
Status: DISCONTINUED | OUTPATIENT
Start: 2019-05-18 | End: 2019-05-19

## 2019-05-18 RX ORDER — DOCUSATE SODIUM 100 MG/1
100 CAPSULE, LIQUID FILLED ORAL 2 TIMES DAILY
Status: DISCONTINUED | OUTPATIENT
Start: 2019-05-18 | End: 2019-05-19

## 2019-05-18 RX ORDER — FENTANYL 100 UG/H
1 PATCH TRANSDERMAL
Status: DISCONTINUED | OUTPATIENT
Start: 2019-05-18 | End: 2019-05-19

## 2019-05-18 RX ORDER — MORPHINE SULFATE 4 MG/ML
8 INJECTION, SOLUTION INTRAMUSCULAR; INTRAVENOUS EVERY 2 HOUR PRN
Status: DISCONTINUED | OUTPATIENT
Start: 2019-05-18 | End: 2019-05-19

## 2019-05-18 RX ORDER — METOCLOPRAMIDE HYDROCHLORIDE 5 MG/ML
10 INJECTION INTRAMUSCULAR; INTRAVENOUS EVERY 8 HOURS PRN
Status: DISCONTINUED | OUTPATIENT
Start: 2019-05-18 | End: 2019-05-19

## 2019-05-18 RX ORDER — MORPHINE SULFATE 15 MG/1
15 TABLET ORAL EVERY 4 HOURS PRN
Status: DISCONTINUED | OUTPATIENT
Start: 2019-05-18 | End: 2019-05-19

## 2019-05-18 RX ORDER — BISACODYL 10 MG
10 SUPPOSITORY, RECTAL RECTAL
Status: DISCONTINUED | OUTPATIENT
Start: 2019-05-18 | End: 2019-05-19

## 2019-05-18 RX ORDER — DIPHENHYDRAMINE HCL 25 MG
25 CAPSULE ORAL EVERY 6 HOURS PRN
Status: DISCONTINUED | OUTPATIENT
Start: 2019-05-18 | End: 2019-05-19

## 2019-05-18 RX ORDER — CLONAZEPAM 0.5 MG/1
0.5 TABLET ORAL 2 TIMES DAILY
Status: DISCONTINUED | OUTPATIENT
Start: 2019-05-18 | End: 2019-05-19

## 2019-05-18 RX ORDER — PANTOPRAZOLE SODIUM 40 MG/1
40 TABLET, DELAYED RELEASE ORAL
Status: DISCONTINUED | OUTPATIENT
Start: 2019-05-19 | End: 2019-05-19

## 2019-05-18 RX ORDER — MORPHINE SULFATE 4 MG/ML
4 INJECTION, SOLUTION INTRAMUSCULAR; INTRAVENOUS EVERY 2 HOUR PRN
Status: DISCONTINUED | OUTPATIENT
Start: 2019-05-18 | End: 2019-05-19

## 2019-05-18 RX ORDER — POLYETHYLENE GLYCOL 3350 17 G/17G
17 POWDER, FOR SOLUTION ORAL DAILY PRN
Status: DISCONTINUED | OUTPATIENT
Start: 2019-05-18 | End: 2019-05-19

## 2019-05-18 RX ORDER — MULTIVITAMIN/IRON/FOLIC ACID 18MG-0.4MG
250 TABLET ORAL NIGHTLY
Status: DISCONTINUED | OUTPATIENT
Start: 2019-05-18 | End: 2019-05-19

## 2019-05-18 RX ORDER — PREGABALIN 100 MG/1
100 CAPSULE ORAL 2 TIMES DAILY
Status: DISCONTINUED | OUTPATIENT
Start: 2019-05-18 | End: 2019-05-19

## 2019-05-18 RX ORDER — LISINOPRIL 10 MG/1
10 TABLET ORAL DAILY
Status: DISCONTINUED | OUTPATIENT
Start: 2019-05-19 | End: 2019-05-19

## 2019-05-18 RX ORDER — MORPHINE SULFATE 15 MG/1
15 TABLET, FILM COATED, EXTENDED RELEASE ORAL EVERY 12 HOURS SCHEDULED
Status: DISCONTINUED | OUTPATIENT
Start: 2019-05-18 | End: 2019-05-19

## 2019-05-18 RX ORDER — MORPHINE SULFATE 4 MG/ML
12 INJECTION, SOLUTION INTRAMUSCULAR; INTRAVENOUS EVERY 2 HOUR PRN
Status: DISCONTINUED | OUTPATIENT
Start: 2019-05-18 | End: 2019-05-19

## 2019-05-18 NOTE — H&P
.  CC: Patient presents with:  Abdomen/Flank Pain (GI/)       PCP: Enedina Hdz MD    History of Present Illness: Patient is a 62year old male with PMH sig for esophageal cancer, chronic pain, high narcotic tolerance, depression/anxiety who presen Squamous cell carcinoma, esophagus (HCC) 2/6/2019   • Unspecified essential hypertension    • Visual impairment     reading glasses        PSH  Past Surgical History:   Procedure Laterality Date   • CERVICAL EPIDURAL N/A 9/14/2018    Performed by Vahe Stern by Josy Srinivasan MD at [facility] ENDOSCOPY   • ESOPHAGOGASTRODUODENOSCOPY (EGD) N/A 4/2/2014    Performed by Josy Srinivasan MD at [facility] ENDOSCOPY   • ESOPHAGOGASTRODUODENOSCOPY, POSSIBLE BIOPSY, POSSIBLE POLYPECTOMY 45760 N/A 6/8/2011    Performed by Kate Shipley 6/23/16    botox injection into the lower esophagus to treat achalasia;  Edward        ALL:  No Known Allergies     Home Medications:    Outpatient Medications Marked as Taking for the 5[facility]18/[facility]  Dicyclomine HCl 20 MG Oral Tab Systems  Comprehensive ROS reviewed and negative except for what's stated above. OBJECTIVE:  /74 (BP Location: Right arm)   Pulse 60   Temp 97.7 °F (36.5 °C) (Oral)   Resp 20   Ht 167.6 cm (5' 6\")   Wt 101 lb 8 oz (46 kg)   SpO2 96%   BMI 16. 3 intra-abdominal fat. LUNG BASE:  Minimal scarring/atelectasis LIVER:  Nonspecific peripheral calcifications again noted BILIARY:  Unremarkable. SPLEEN:  Unremarkable. PANCREAS:  Unremarkable. ADRENALS:  Unremarkable.  KIDNEYS:  No urinary calculi or obstruc right now. - restart bentyl- this was recommended by GI in the past  - will need to figure out pain med plan- suspect that pt's malignancy and need for high dose narcotics will continue to cause sx.  Pt preferred palliative care involvement only during las

## 2019-05-18 NOTE — ED PROVIDER NOTES
Patient Seen in: [facility] Emergency Department    History   Patient presents with:  Abdomen/Flank Pain (GI/)    Stated Complaint: abd pain with recent admission    HPI    68-year-old male presents to the emergency department for evaluation of ge 5/11/2010   • Esophageal cancer ([facility] [address]    • Essential hypertension, benign 8/17/2007   • Fibromyalgia 3/3/2011   • High blood pressure    • Hyperglycemia 12/3/2018   • Irritable bowel syndrome    • KIDNEY STONE    • Long term current use of opiate analgesic ENDOSCOPY   • ESOPHAGOGASTRODUODENOSCOPY (EGD) N/A 10/27/2017    Performed by Lizbet Candelaria MD at [facility] ENDOSCOPY   • ESOPHAGOGASTRODUODENOSCOPY (EGD) N/A 2/20/2017    Performed by Lizbet Candelaria MD at [facility] ENDOSCOPY   • ESOPHAGOGASTRODUODENOSCOPY (EGD) N/ BRANCH NERVE BLOCK Bilateral 1/26/2017    Performed by Ana Holly MD at . [address] Bilateral 2/14/2017    Performed by Ana Holly MD at [address] [place] [address]   • OTHER SURGICAL HISTORY  7 Anicteric sclera. Oral mucosa is moist.  Oropharynx is normal.  Neck: No adenopathy or thyromegaly. Lungs are clear to auscultation. Heart exam: Normal S1-S2 without extra sounds or murmurs. Regular rate and rhythm. Abdomen: Normoactive bowel sounds. familiar with him. Observation hospitalization was recommended for pain control. Treatment plan was discussed with the patient and his wife. MDM   Acute abdominal pain and vomiting.   The patient has had episodes of pain and vomiting in the past thou

## 2019-05-18 NOTE — ED INITIAL ASSESSMENT (HPI)
Pt to ED with c/o left sided abd pain. Pt states he was seen Thursday for same thing and was DC'd. Pt states he has been having NVD, had vomited and had diarrhea approximately 6 times this morning.

## 2019-05-18 NOTE — ED NOTES
Per ED MD order pt given 20mg IVP Morphine at this time. Pt on continuous pulse oximetry. Will continue to monitor.

## 2019-05-18 NOTE — ED NOTES
Rounded on patient. Patient lying prone on bed. States pain 7/10 since last dose of morphine. Patient reports \"it's starting to work. \" Blood pressure down since last morphine administration, will recheck in 10 minutes before administering any more morphi

## 2019-05-18 NOTE — PROGRESS NOTES
Late entry:   Placed call to patient's pain doctor, Dr. Philomena Richmond office earlier today after patient visit noted earlier. Spoke to [facility] and requested to speak to Dr. Rosa Willoughby patient's request to change PO dilaudid to morphine.  Did not receive call back

## 2019-05-19 VITALS
TEMPERATURE: 98 F | RESPIRATION RATE: 20 BRPM | DIASTOLIC BLOOD PRESSURE: 82 MMHG | OXYGEN SATURATION: 99 % | HEART RATE: 60 BPM | SYSTOLIC BLOOD PRESSURE: 144 MMHG | WEIGHT: 101.5 LBS | HEIGHT: 66 IN | BODY MASS INDEX: 16.31 KG/M2

## 2019-05-19 RX ORDER — MORPHINE SULFATE 15 MG/1
15 TABLET ORAL EVERY 4 HOURS PRN
Qty: 18 TABLET | Refills: 0 | Status: SHIPPED | OUTPATIENT
Start: 2019-05-19 | End: 2019-09-20

## 2019-05-19 RX ORDER — MORPHINE SULFATE 15 MG/1
15 TABLET, FILM COATED, EXTENDED RELEASE ORAL EVERY 12 HOURS SCHEDULED
Qty: 6 TABLET | Refills: 0 | Status: SHIPPED | OUTPATIENT
Start: 2019-05-19 | End: 2019-05-22

## 2019-05-19 RX ORDER — MORPHINE SULFATE 4 MG/ML
10 INJECTION, SOLUTION INTRAMUSCULAR; INTRAVENOUS EVERY 30 MIN PRN
Status: DISCONTINUED | OUTPATIENT
Start: 2019-05-19 | End: 2019-05-19

## 2019-05-19 RX ORDER — ONDANSETRON 2 MG/ML
4 INJECTION INTRAMUSCULAR; INTRAVENOUS EVERY 4 HOURS PRN
Status: DISCONTINUED | OUTPATIENT
Start: 2019-05-19 | End: 2019-05-19

## 2019-05-19 RX ORDER — SODIUM CHLORIDE 9 MG/ML
INJECTION, SOLUTION INTRAVENOUS CONTINUOUS
Status: ACTIVE | OUTPATIENT
Start: 2019-05-19 | End: 2019-05-19

## 2019-05-19 NOTE — PROGRESS NOTES
Patient administered intravenous Morphine per MD order for his complaint of 7 out of 10 abdominal pain. Enteric/Contact Plus isolation initiated due to the patient's report of having 3 episodes of diarrhea today.

## 2019-05-19 NOTE — PLAN OF CARE
Assumed patient care at 1900  Patient A&O x 4  Complaints of pain and discomfort to abdomen.  PO morphine given with relief noted  VSS  Lovenox sub Q  UA sent  Contact plus isolation to R/O c-diff  IVF infusing  Patient currently resting in bed and updated goal  Description  INTERVENTIONS:  - Encourage pt to monitor pain and request assistance  - Assess pain using appropriate pain scale  - Administer analgesics based on type and severity of pain and evaluate response  - Implement non-pharmacological measures

## 2019-05-19 NOTE — PLAN OF CARE
Assumed pt care this morning. Aa/ox4. Breathing unlabored. C/o abd pain, per pt oral morphine regimen controlling pain adequately so far. Tolerating po intake. No more diarrhea, r/o cdiff. Up ad jose.

## 2019-05-19 NOTE — PLAN OF CARE
No more bms. Cleared for d/c home. Given and reviewed avs, verbalized understanding. 2 morphine prescriptions given to patient. Spouse here to take home.

## 2019-05-19 NOTE — PROGRESS NOTES
[facility] [place] Resource Referral Counselor Note    Stefan Goldmann Patient Status:  Observation    1965 MRN [id number]   [facility] 3NE-A Attending Jono Jacome MD   Hosp Day # 0 PCP Chloe Mcgee MD       S(subjective) Scarlet Caitlyn

## 2019-05-20 NOTE — DISCHARGE SUMMARY
[facility] Discharge Summary    Patient ID:  Dennise Mathew  [id number]  62year old  1/30/1965    Admit date: 5/18/2019  Discharge date and time: 5/19/19  Attending Physician: Ricarda Singer MD  Primary Care Physician: Clarence Mchugh MD control and his follow-up plans. Pt was clearly frustrated by recurrent admits for acute on chronic pain. He has seen Dr. Erlinda Sadler from pain clinic in regards to possible intrathecal pump but he has remained unsure on that.  He sees his PCP, Dr. Ned Drummond, c IR. He will need to follow-up with his PCP for further Rx. He was informed that he is to discontinue the dilaudid.     Day of discharge Exam   05/19/19  1243   BP: 144/82   Pulse: 60   Resp: 20   Temp: 98 °F (36.7 °C)     Exam on day of discharge:  Gen: N Crea  Commonly known as:  EMLA  Apply 1 Application topically as needed (prior to treatment).      lisinopril 10 MG Tabs  Commonly known as:  PRINIVIL,ZESTRIL     * maalox/diphenhydramine/lidocaine viscous Susp  Commonly known as:  STOMATITIS COCKTAIL  Take techniques were used. Dose information is transmitted to the ACR [facility] [employer] [facility]) NRDR ([address]) which includes the Dose Index Registry.   PATIENT STATED HISTORY: (As transcribed by Technologist)  Patient presents wit therapeutic plan as outlined

## 2019-05-22 NOTE — PAYOR COMM NOTE
--------------  ADMISSION REVIEW     Payor: [facility]  Subscriber #:  [id number]  Authorization Number: [id number]      ED Provider Notes signed by Efren Jones MD at 5/16/2019 10:57 PM      Patient Seen in: [facility] Emergency Dep back and hands   • Perforation of small intestine (HCC)    • Problems with swallowing     Achalasia   • S/P left colectomy 7/21/2009   • Squamous cell carcinoma, esophagus ([facility] [address] 2/6/2019   • Unspecified essential hypertension    • Visual impairment (EGD) N/A 12/19/2014    Performed by Josy Srinivasan MD at [facility] ENDOSCOPY   • ESOPHAGOGASTRODUODENOSCOPY (EGD) N/A 8/5/2014    Performed by Josy Srinivasan MD at [facility] ENDOSCOPY   • ESOPHAGOGASTRODUODENOSCOPY (EGD) N/A 4/2/2014    Performed by Josy Srinivasan REPORT      colon resection   • SPECIAL SERVICE OR REPORT      esphagus surgery   • TONSILLECTOMY     • UPPER GI ENDOSCOPY,DIAGNOSIS  6/23/16    botox injection into the lower esophagus to treat achalasia;  Edward     Positive for stated complaint: abdomina Plan     Clinical Impression:  Intractable abdominal pain  (primary encounter diagnosis)    Disposition:  Admit  5/16/2019 10:49 pm            5/17/19     H&P - H&P Note      H&P signed by Lorin Tejeda MD at 5/17/2019  2:02 PM     Author:  Lorin Tejeda, Fibromyalgia 3/3/2011   • High blood pressure    • Hyperglycemia 12/3/2018   • Irritable bowel syndrome    • KIDNEY STONE    • Long term current use of opiate analgesic 6/15/2010   • Malignant neoplasm of middle third of esophagus (HCC) 2/1/2019   • Muscle Hansel Delgadillo MD at [facility] ENDOSCOPY   • ESOPHAGOGASTRODUODENOSCOPY (EGD) N/A 2/20/2017    Performed by Hansel Delgadillo MD at [facility] ENDOSCOPY   • ESOPHAGOGASTRODUODENOSCOPY (EGD) N/A 6/23/2016    Performed by Hansel Delgadillo MD at [facility] ENDOSCOPY   • [address] [place] [address] at . [facility] [address] Bilateral 2/14/2017    Performed by Marylee Parry, MD at [address]   • OTHER SURGICAL HISTORY  7/09    egd with botox injection into GE junction   • OTHER SURGICAL HISTORY 60 tablet Rfl: 3   pregabalin (LYRICA) 100 MG Oral Cap Take 1 capsule (100 mg total) by mouth 2 (two) times daily. Disp: 60 capsule Rfl: 5   lisinopril 10 MG Oral Tab Take 10 mg by mouth daily.  Disp:  Rfl:    Fluticasone Propionate 50 MCG/ACT Nasal Suspens 11.4*   HGB 13.7   MCV 86.4   .0       Recent Labs   Lab 05/16/19 1951   *   K 4.0   CL 99   CO2 28.0   BUN 8   CREATSERUM 0.96   *   CA 9.8       Recent Labs   Lab 05/16/19 1951   ALT 20   AST 17   ALB 4.1         Radiology: Ct Abdom Degenerative changes in the spine and hips. OTHER:  None. CONCLUSION:   1. No acute intra-abdominal/pelvic process identified. 2. Stable nonspecific calcifications along the peritoneal reflections.   Dictated by: Verona Argueta MD on 5/16/2019 at 21: abdominal pain is related to the cancer. I d/w patient my recommendation for additional evaluation with the oncology service and/or GI service for additional review in case of acute issues due to his cancer and known involvement of the aorta.  Pt voiced und

## 2019-09-20 ENCOUNTER — ANESTHESIA EVENT (OUTPATIENT)
Dept: ENDOSCOPY | Facility: HOSPITAL | Age: 54
End: 2019-09-20
Payer: COMMERCIAL

## 2019-09-20 RX ORDER — SUCRALFATE 1 G/1
1 TABLET ORAL DAILY
COMMUNITY

## 2019-09-20 RX ORDER — HYDROMORPHONE HYDROCHLORIDE 1 MG/ML
1 SOLUTION ORAL
COMMUNITY
End: 2020-08-26

## 2019-09-20 RX ORDER — METHADONE HYDROCHLORIDE 10 MG/5ML
SOLUTION ORAL EVERY 6 HOURS PRN
COMMUNITY
End: 2020-08-26

## 2019-09-20 RX ORDER — OMEPRAZOLE 20 MG/1
20 CAPSULE, DELAYED RELEASE ORAL
COMMUNITY

## 2019-09-20 RX ORDER — LORAZEPAM 2 MG/ML
2 CONCENTRATE ORAL EVERY 8 HOURS PRN
COMMUNITY
End: 2020-09-11

## 2019-09-21 ENCOUNTER — HOSPITAL ENCOUNTER (OUTPATIENT)
Facility: HOSPITAL | Age: 54
Setting detail: HOSPITAL OUTPATIENT SURGERY
Discharge: HOME | End: 2019-09-21
Attending: INTERNAL MEDICINE | Admitting: INTERNAL MEDICINE
Payer: COMMERCIAL

## 2019-09-21 ENCOUNTER — ANESTHESIA (OUTPATIENT)
Dept: ENDOSCOPY | Facility: HOSPITAL | Age: 54
End: 2019-09-21
Payer: COMMERCIAL

## 2019-09-21 VITALS
HEART RATE: 82 BPM | BODY MASS INDEX: 16.55 KG/M2 | SYSTOLIC BLOOD PRESSURE: 130 MMHG | TEMPERATURE: 99 F | DIASTOLIC BLOOD PRESSURE: 76 MMHG | RESPIRATION RATE: 20 BRPM | WEIGHT: 103 LBS | OXYGEN SATURATION: 94 % | HEIGHT: 66 IN

## 2019-09-21 DIAGNOSIS — R13.19 ESOPHAGEAL DYSPHAGIA: ICD-10-CM

## 2019-09-21 DIAGNOSIS — K22.0 ACHALASIA: ICD-10-CM

## 2019-09-21 PROCEDURE — 3E0G8GC INTRODUCTION OF OTHER THERAPEUTIC SUBSTANCE INTO UPPER GI, VIA NATURAL OR ARTIFICIAL OPENING ENDOSCOPIC: ICD-10-PCS | Performed by: INTERNAL MEDICINE

## 2019-09-21 RX ORDER — SODIUM CHLORIDE, SODIUM LACTATE, POTASSIUM CHLORIDE, CALCIUM CHLORIDE 600; 310; 30; 20 MG/100ML; MG/100ML; MG/100ML; MG/100ML
INJECTION, SOLUTION INTRAVENOUS CONTINUOUS
Status: DISCONTINUED | OUTPATIENT
Start: 2019-09-21 | End: 2019-09-21

## 2019-09-21 RX ORDER — NALOXONE HYDROCHLORIDE 0.4 MG/ML
80 INJECTION, SOLUTION INTRAMUSCULAR; INTRAVENOUS; SUBCUTANEOUS AS NEEDED
Status: DISCONTINUED | OUTPATIENT
Start: 2019-09-21 | End: 2019-09-21

## 2019-09-21 NOTE — ANESTHESIA POSTPROCEDURE EVALUATION
[address] Patient Status:  Hospital Outpatient Surgery   Age/Gender 62year old male MRN [id number]   Location [facility]. Attending Joanna Richey MD   Hosp Day # 0 PCP Mariah Sheffield MD       Anesthesia Post-op

## 2019-09-21 NOTE — ANESTHESIA PREPROCEDURE EVALUATION
PRE-OP EVALUATION    Patient Name: Андрей Hale    Pre-op Diagnosis: Esophageal dysphagia [R13.10]  Achalasia [K22.0]    Procedure(s):  ESOPHAGOGASTRODUODENOSCOPY (EGD) BOTOX INJECTION 200 IU    Surgeon(s) and Role:     * Adan Bumpers, MD - Primary solution Take 5-10 mL by mouth TID AC&HS. [address]-24 Disp: 300 mL Rfl: 0   Fluticasone Propionate 50 MCG/ACT Nasal Suspension 2 sprays by Each Nare route daily.  (Patient taking differently: 2 sprays by Each Nare route daily as needed.  ) Disp: 16 g Rfl: ENDOSCOPY   • ESOPHAGOGASTRODUODENOSCOPY (EGD) N/A 10/27/2017    Performed by Bridget Escalante MD at [facility] ENDOSCOPY   • ESOPHAGOGASTRODUODENOSCOPY (EGD) N/A 2/20/2017    Performed by Bridget Escalante MD at [facility] ENDOSCOPY   • ESOPHAGOGASTRODUODENOSCOPY (EGD) N/ BRANCH NERVE BLOCK Bilateral 1/26/2017    Performed by Mikael Chávez MD at . [address] Bilateral 2/14/2017    Performed by Mikael Chávez MD at [facility]   • OTHER SURGICAL HISTORY  7

## 2019-09-21 NOTE — H&P
Gastroenterology H and P  By Dr. Crys Newby  CC: Achalasia and dysphagia    HPI:  Merle Mcguire is a 62year old male with achalasia and recent diagnosis of unresectable squamous cell carcinoma of the esophagus.   Dysphagia related to achalasia has been Past Surgical History:   Procedure Laterality Date   • CERVICAL EPIDURAL N/A 9/14/2018    Performed by Walter Batista MD at [facility] [address] N/A 8/27/2018    Performed by Walter Batista MD at [place] [address] • ESOPHAGOGASTRODUODENOSCOPY, POSSIBLE BIOPSY, POSSIBLE POLYPECTOMY [address]338 N/A 6/8/2011    Performed by Alejandra Restrepo MD at [place] [address] 10/31/2013    Performed by Alejandra Restrepo MD at [facility] ENDOSCOPY   • INSERTION PORT-A Smoker        Packs/day: 1.00        Years: 25.00        Pack years: 25        Types: Cigarettes        Quit date: 10/17/2018        Years since quittin.9      Smokeless tobacco: Never Used    Alcohol use: No    Drug use: Not Currently      Frequency: symptomatic achalasia. Metastatic squamous cell esophageal cancer diagnosed 10/2018    Plan:       1. EGD with MAC and botox injection today. Thanks for the opportunity to participate in this patient's care.   Peter Ragsdale MD - Gastroenterology

## 2019-09-21 NOTE — BRIEF OP NOTE
Pre-Operative Diagnosis: Esophageal dysphagia [R13.10]  Achalasia [K22.0]     Post-Operative Diagnosis: ACHALASIA      Procedure Performed:   Procedure(s):  ESOPHAGOGASTRODUODENOSCOPY (EGD) with injection of botox BOTOX INJECTION 200 IU    Surgeon(s) and JRAET

## 2020-08-13 PROBLEM — F33.40 RECURRENT MAJOR DEPRESSIVE DISORDER, IN REMISSION (HCC): Status: ACTIVE | Noted: 2020-08-13

## 2020-08-17 PROBLEM — Z71.89 GOALS OF CARE, COUNSELING/DISCUSSION: Status: RESOLVED | Noted: 2019-05-17 | Resolved: 2020-08-17

## 2020-08-17 PROBLEM — C15.9 MALIGNANT NEOPLASM OF ESOPHAGUS, UNSPECIFIED LOCATION (HCC): Status: RESOLVED | Noted: 2019-04-08 | Resolved: 2020-08-17

## 2020-08-17 PROBLEM — R73.9 HYPERGLYCEMIA: Status: RESOLVED | Noted: 2019-04-08 | Resolved: 2020-08-17

## 2020-08-17 PROBLEM — Z72.0 TOBACCO USE: Status: RESOLVED | Noted: 2018-06-25 | Resolved: 2020-08-17

## 2020-08-17 PROBLEM — R10.9 ABDOMINAL PAIN OF UNKNOWN ETIOLOGY: Status: RESOLVED | Noted: 2019-04-08 | Resolved: 2020-08-17

## 2020-08-17 PROBLEM — C15.9 MALIGNANT NEOPLASM OF ESOPHAGUS (HCC): Status: RESOLVED | Noted: 2019-03-26 | Resolved: 2020-08-17

## 2020-08-17 PROBLEM — M43.12 SPONDYLOLISTHESIS OF CERVICAL REGION: Status: RESOLVED | Noted: 2018-07-26 | Resolved: 2020-08-17

## 2020-08-17 PROBLEM — R10.9 ABDOMINAL PAIN, ACUTE: Status: RESOLVED | Noted: 2019-05-18 | Resolved: 2020-08-17

## 2020-10-03 ENCOUNTER — LAB ENCOUNTER (OUTPATIENT)
Dept: LAB | Facility: HOSPITAL | Age: 55
End: 2020-10-03
Attending: INTERNAL MEDICINE
Payer: COMMERCIAL

## 2020-10-03 DIAGNOSIS — R13.10 PROBLEMS WITH SWALLOWING: ICD-10-CM

## 2020-10-05 NOTE — PROGRESS NOTES
The nasal swab test for the the COVID-19 virus was negative.     You may proceed with the planned endoscopy at Naren Harman MD, 10/04/20, 8:49 PM

## 2020-10-05 NOTE — H&P
Mariel Marvin presents for follow up for evaluation and management of constipation, dysphagia from achalasia, esophageal squamous cell cancer. Taking opioids for pain control - left hospice due to concerns around over medication.   Needs to see pain doct infection, and missed cancer.  MAC sedation was administered.        The Olympus video gastroscope was introduced into the mouth and passed into the esophogus after administration of MAC sedation.  The entire examined esophagus was remarkable for a 7 cm ma 10 patch 0   • Zolpidem Tartrate 10 MG Oral Tab Take 1 tablet by mouth daily as needed.       • Sertraline HCl 50 MG Oral Tab Take 50 mg by mouth daily.       • omeprazole 20 MG Oral Capsule Delayed Release Take 20 mg by mouth 2 (two) times daily before me generalized or localized, unspecified site       back and hands   • Perforation of small intestine (HCC)     • Personal history of antineoplastic chemotherapy       LAST CHEMO MONTHS AGO   • Problems with swallowing       Achalasia   • S/P left colectomy 7 MD at [facility] ENDOSCOPY   • ESOPHAGOGASTRODUODENOSCOPY (EGD) N/A 1/21/2016     Performed by Joanna Richey MD at [facility] ENDOSCOPY   • ESOPHAGOGASTRODUODENOSCOPY (EGD) N/A 5/27/2015     Performed by Joanna Richey MD at [address] [place] [address] injection into GE junction   • OTHER SURGICAL HISTORY         colectomy   • OTHER SURGICAL HISTORY   6/2012     egd - botox injection   • OTHER SURGICAL HISTORY   11/2012     egd - botox injection   • OTHER SURGICAL HISTORY   3/5/2013     EGD - botox injec no  edema    NEURO: Alert + Oriented times three     Assessment:   Achalasia  Dysphagia  Constipation  Esophageal squamous cell cancer with mets                Pt requesting egd with botox injection of LES for symptomatic relief of achalasia associated dys

## 2020-10-05 NOTE — OPERATIVE REPORT
PATIENT NAME: Cornel Fields  MRN: [id number]  DATE OF OPERATION:  10/5/20  REFERRING PHYSICIAN: Dr. Terry Andrade  Medications:  MAC  PREOPERATIVE DIAGNOSIS    Dysphagia   Achalasia  POSTOPERATIVE DIAGNOSIS:  1. 7 cm mass extending from 31 to 38 cm from the inci mg po bid. 3. Resume general diet. 4. Follow up by phone with progress report in 1 -2 weeks.   Augusto Burrows MD, Gastroenterologist  Cc: Dr. Jeremy Martinez

## 2020-10-06 ENCOUNTER — HOSPITAL ENCOUNTER (OUTPATIENT)
Facility: HOSPITAL | Age: 55
Setting detail: HOSPITAL OUTPATIENT SURGERY
Discharge: HOME | End: 2020-10-06
Attending: INTERNAL MEDICINE | Admitting: INTERNAL MEDICINE
Payer: COMMERCIAL

## 2020-10-06 ENCOUNTER — ANESTHESIA EVENT (OUTPATIENT)
Dept: ENDOSCOPY | Facility: HOSPITAL | Age: 55
End: 2020-10-06
Payer: COMMERCIAL

## 2020-10-06 ENCOUNTER — ANESTHESIA (OUTPATIENT)
Dept: ENDOSCOPY | Facility: HOSPITAL | Age: 55
End: 2020-10-06
Payer: COMMERCIAL

## 2020-10-06 VITALS
OXYGEN SATURATION: 98 % | DIASTOLIC BLOOD PRESSURE: 122 MMHG | HEART RATE: 95 BPM | TEMPERATURE: 98 F | BODY MASS INDEX: 16.71 KG/M2 | RESPIRATION RATE: 17 BRPM | SYSTOLIC BLOOD PRESSURE: 138 MMHG | WEIGHT: 104 LBS | HEIGHT: 66 IN

## 2020-10-06 DIAGNOSIS — R13.10 PROBLEMS WITH SWALLOWING: Primary | ICD-10-CM

## 2020-10-06 DIAGNOSIS — K22.0 ACHALASIA: ICD-10-CM

## 2020-10-06 PROCEDURE — 3E0G8GC INTRODUCTION OF OTHER THERAPEUTIC SUBSTANCE INTO UPPER GI, VIA NATURAL OR ARTIFICIAL OPENING ENDOSCOPIC: ICD-10-PCS | Performed by: INTERNAL MEDICINE

## 2020-10-06 RX ORDER — SODIUM CHLORIDE, SODIUM LACTATE, POTASSIUM CHLORIDE, CALCIUM CHLORIDE 600; 310; 30; 20 MG/100ML; MG/100ML; MG/100ML; MG/100ML
INJECTION, SOLUTION INTRAVENOUS CONTINUOUS
Status: DISCONTINUED | OUTPATIENT
Start: 2020-10-06 | End: 2020-10-06

## 2020-10-06 RX ORDER — NALOXONE HYDROCHLORIDE 0.4 MG/ML
80 INJECTION, SOLUTION INTRAMUSCULAR; INTRAVENOUS; SUBCUTANEOUS AS NEEDED
Status: DISCONTINUED | OUTPATIENT
Start: 2020-10-06 | End: 2020-10-06

## 2020-10-06 NOTE — ANESTHESIA PREPROCEDURE EVALUATION
PRE-OP EVALUATION    Patient Name: Jomar Chopra    Pre-op Diagnosis: Achalasia [K22.0]    Procedure(s):  ESOPHAGOGASTRODUODENOSCOPY WITH BOTOX 200 INTERNATIONAL UNITS INJECTED INTO THE ESOPHAGUS    Surgeon(s) and Role:     * Major MD Heather Delgadillo Pulmonary                           Neuro/Psych      (+) depression           (+) neuromuscular disease             achalasia      Past Surgical History:   Procedure Laterality Date   • CERVICAL EPIDURAL N/A 9/14/2018    Performed Performed by Katlyn Pickett MD at [address] [place] [address] ENDOSCOPY   • ESOPHAGOGASTRODUODENOSCOPY (EGD) N/A 8/5/2014    Performed by Katlyn Pickett MD at [address] [place] [address] ENDOSCOPY   • ESOPHAGOGASTRODUODENOSCOPY (EGD) N/A 4/2/2014    Performed by Katlyn Pickett MD at [address] [place] [address] resection   • SPECIAL SERVICE OR REPORT      esphagus surgery   • TONSILLECTOMY     • UPPER GI ENDOSCOPY,DIAGNOSIS  6/23/16    botox injection into the lower esophagus to treat achalasia;  Edward     Social History    Tobacco Use      Smoking status: Former

## 2020-10-06 NOTE — ANESTHESIA POSTPROCEDURE EVALUATION
[address] Patient Status:  Hospital Outpatient Surgery   Age/Gender 60year old male MRN [id number]   Location [address] [facility]. Attending Josy Srinivasan MD   Hosp Day # 0 PCP Luiza Peterson MD       Anesthesia Post-op

## 2020-10-06 NOTE — BRIEF OP NOTE
Pre-Operative Diagnosis: Achalasia [K22.0]     Post-Operative Diagnosis: Achalasia [K22.0], Esophageal tumor      Procedure Performed:   Procedure(s):  ESOPHAGOGASTRODUODENOSCOPY WITH BOTOX 200 INTERNATIONAL UNITS INJECTED INTO THE ESOPHAGUS    Surgeon(s)

## 2020-11-29 ENCOUNTER — APPOINTMENT (OUTPATIENT)
Dept: CT IMAGING | Facility: HOSPITAL | Age: 55
End: 2020-11-29
Attending: EMERGENCY MEDICINE
Payer: COMMERCIAL

## 2020-11-29 ENCOUNTER — HOSPITAL ENCOUNTER (EMERGENCY)
Facility: HOSPITAL | Age: 55
Discharge: HOME | End: 2020-11-29
Attending: EMERGENCY MEDICINE
Payer: COMMERCIAL

## 2020-11-29 VITALS
SYSTOLIC BLOOD PRESSURE: 146 MMHG | HEIGHT: 66 IN | HEART RATE: 90 BPM | BODY MASS INDEX: 16.88 KG/M2 | DIASTOLIC BLOOD PRESSURE: 99 MMHG | TEMPERATURE: 98 F | WEIGHT: 105 LBS | OXYGEN SATURATION: 96 % | RESPIRATION RATE: 20 BRPM

## 2020-11-29 DIAGNOSIS — R10.9 ABDOMINAL PAIN, LEFT LATERAL: Primary | ICD-10-CM

## 2020-11-29 PROCEDURE — 80053 COMPREHEN METABOLIC PANEL: CPT | Performed by: EMERGENCY MEDICINE

## 2020-11-29 PROCEDURE — 74177 CT ABD & PELVIS W/CONTRAST: CPT | Performed by: EMERGENCY MEDICINE

## 2020-11-29 PROCEDURE — 96375 TX/PRO/DX INJ NEW DRUG ADDON: CPT

## 2020-11-29 PROCEDURE — 99284 EMERGENCY DEPT VISIT MOD MDM: CPT

## 2020-11-29 PROCEDURE — 85025 COMPLETE CBC W/AUTO DIFF WBC: CPT | Performed by: EMERGENCY MEDICINE

## 2020-11-29 PROCEDURE — 96374 THER/PROPH/DIAG INJ IV PUSH: CPT

## 2020-11-29 PROCEDURE — 96361 HYDRATE IV INFUSION ADD-ON: CPT

## 2020-11-29 RX ORDER — LORAZEPAM 2 MG/ML
1 INJECTION INTRAMUSCULAR ONCE
Status: COMPLETED | OUTPATIENT
Start: 2020-11-29 | End: 2020-11-29

## 2020-11-29 RX ORDER — DICYCLOMINE HYDROCHLORIDE 10 MG/5ML
10 SOLUTION ORAL 4 TIMES DAILY PRN
Qty: 120 ML | Refills: 0 | Status: SHIPPED | OUTPATIENT
Start: 2020-11-29 | End: 2020-12-29

## 2020-11-29 RX ORDER — KETOROLAC TROMETHAMINE 30 MG/ML
15 INJECTION, SOLUTION INTRAMUSCULAR; INTRAVENOUS ONCE
Status: COMPLETED | OUTPATIENT
Start: 2020-11-29 | End: 2020-11-29

## 2020-11-29 NOTE — ED INITIAL ASSESSMENT (HPI)
Pt presents to ed after taking 20mg of hydromorphone last night in 4 separate doses starting at 8pm last night, pt is A&O x 4 and does not appear in any respiratory distress.  Pt denies any SI and is on the medication for pain from CA

## 2020-11-29 NOTE — ED NOTES
Spouse arrives to waiting room, sts pt took 20mls of hydromorphone last night. Spouse denies attempt to hurt himself.

## 2020-11-29 NOTE — ED PROVIDER NOTES
Patient Seen in: [facility] Emergency Department      History   Patient presents with:  Poisoning/Overdose    Stated Complaint: Possible overdose     HPI    Patient is a 15-year-old male here for lower abdominal pain for the left side.   Is been pres unspecified site     back and hands   • Perforation of small intestine (HCC)    • Personal history of antineoplastic chemotherapy 2019    LAST CHEMO 1 year ago   • Problems with swallowing     Achalasia   • S/P left colectomy 7/21/2009   • Squamous cell ca ESOPHAGOGASTRODUODENOSCOPY (EGD) N/A 6/23/2016    Performed by Dorys Kim MD at [facility] ENDOSCOPY   • ESOPHAGOGASTRODUODENOSCOPY (EGD) N/A 1/21/2016    Performed by Dorys Kim MD at [facility] ENDOSCOPY   • ESOPHAGOGASTRODUODENOSCOPY (EGD) N/A 5/27/2015 MANAGEMENT   • OTHER SURGICAL HISTORY  7/09    egd with botox injection into GE junction   • OTHER SURGICAL HISTORY      colectomy   • OTHER SURGICAL HISTORY  6/2012    egd - botox injection   • OTHER SURGICAL HISTORY  11/2012    egd - botox injection   • clubbing, no cyanosis. Abdominal: Soft. There is no tenderness. There is no guarding. Musculoskeletal: No swelling, deformity or ecchymosis. Neurological: Pt is alert and oriented to person, place, and time. No cranial nerve deficit.      Skin: Skin EDWARD , CT, CT ABDOMEN+PELVIS(CPT=74176), 5/16/2019, 9:17 PM.  INDICATIONS:  LLQ pain, known esophageal CA, no treatment.   TECHNIQUE:  CT scanning was performed from the dome of the diaphragm to the pubic symphysis with non-ionic intravenous contrast mate the large and small bowel could be related to nonspecific enterocolitis. Clinical correlation recommended. 2. Mild scarring/atelectasis in the lower lungs with developing infiltrates in the left lower lobe not entirely excluded.   Clinical correlation rec

## 2021-01-05 ENCOUNTER — HOSPITAL ENCOUNTER (OUTPATIENT)
Dept: MRI IMAGING | Facility: HOSPITAL | Age: 56
Discharge: HOME | End: 2021-01-05
Attending: INTERNAL MEDICINE
Payer: COMMERCIAL

## 2021-01-05 DIAGNOSIS — R19.8 ABNORMAL FINDINGS-GASTROINTESTINAL TRACT: ICD-10-CM

## 2021-01-05 PROCEDURE — 74183 MRI ABD W/O CNTR FLWD CNTR: CPT | Performed by: INTERNAL MEDICINE

## 2021-01-05 PROCEDURE — A9575 INJ GADOTERATE MEGLUMI 0.1ML: HCPCS | Performed by: INTERNAL MEDICINE

## 2021-01-07 NOTE — PROGRESS NOTES
The MRI abdomen and MRCP did not show any blockage of the bile duct or mass in the area of the bile duct. There are changes in the left lower part of the lung suggestive of current or resolving pneumonia.     If the pt is having any cough or shortness of

## (undated) DEVICE — 3M™ RED DOT™ MONITORING ELECTRODE WITH FOAM TAPE AND STICKY GEL, 50/BAG, 20/CASE, 72/PLT 2570: Brand: RED DOT™

## (undated) DEVICE — ENDOSCOPY PACK UPPER: Brand: MEDLINE INDUSTRIES, INC.

## (undated) DEVICE — 1200CC GUARDIAN II: Brand: GUARDIAN

## (undated) DEVICE — Device: Brand: DEFENDO AIR/WATER/SUCTION AND BIOPSY VALVE

## (undated) DEVICE — FILTERLINE NASAL ADULT O2/CO2

## (undated) DEVICE — FORCEP BIOPSY RJ4 LG CAP W/ND

## (undated) DEVICE — ECHOTIP ULTRA: Brand: ECHOTIP

## (undated) DEVICE — SYRINGE 10ML LL TIP

## (undated) DEVICE — NEEDLE CONTRAST INTERJECT 25G

## (undated) DEVICE — SLIDE FROSTED EDGE

## (undated) DEVICE — SYRINGE IV FLUSH PRE-FILL10M

## (undated) NOTE — LETTER
[facility]  Ana Asif 61 5850 [address]    Consent for Operation    Date: __________________    Time: _______________    1.  I authorize the performance upon Nena Duarte the following operation:    Procedure(s):  ESOPHAGOGASTRODUODEN has been videotaped, the surgeon will obtain the original videotape. The hospital will not be responsible for storage or maintenance of this tape.     6. For the purpose of advancing medical education, I consent to the admittance of observers to the [facility] FILLED IN.     Signature of Patient:   ___________________________    When the patient is a minor or mentally incompetent to give consent:  Signature of person authorized to consent for patient: ___________________________   Relationship to patient: _______ anesthesiologist about these medicines may increase my risk of anesthetic complications. · If I am allergic to anything or have had a reaction to anesthesia before. 3. I understand how the anesthesia medicine will help me (benefits).     4. I understand answered their questions. The patient or their representative has agreed to have anesthesia services.     _____________________________________________________________________________  Witness        Date   Time  I have verified that the signature is that o

## (undated) NOTE — ED AVS SNAPSHOT
Jomar Chopra   MRN: [id number]    Department:  [facility] Emergency Department   Date of Visit:  2/20/2019           Disclosure     Insurance plans vary and the physician(s) referred by the ER may not be covered by your plan.  Please contact you physician (or your personal doctor if your instructions are to return to your personal doctor) about any new or lasting problems. The primary care or specialist physician will see patients referred from the [facility] Emergency Department.  Follow-up c

## (undated) NOTE — LETTER
Mary Doyle 182 6 13[facility]  [place], [address] [facility]    Consent for Operation  Date: __________________                                Time: _______________    1.  I authorize the performance upon [facility]  the following operation:  Procedure videotaped, the surgeon will obtain the original videotape. The hospital will not be responsible for storage or maintenance of this tape. 8. For the purpose of advancing medical education, I consent to the admittance of observers to the Operating Room.   9 IN.    Signature of Patient:   ___________________________    When the patient is a minor or mentally incompetent to give consent:  Signature of person authorized to consent for patient: ___________________________   Relationship to patient: ______________ anesthesiologist about these medicines may increase my risk of anesthetic complications. iv. If I am allergic to anything or have had a reaction to anesthesia before. 3. I understand how the anesthesia medicine will help me (benefits).   4. I understand t their questions. The patient or their representative has agreed to have anesthesia services.     _____________________________________________________________________________  Witness        Date   Time  I have verified that the signature is that of the racquel

## (undated) NOTE — ED AVS SNAPSHOT
Meghan Valdes   MRN: [id number]    Department:  [facility] Emergency Department   Date of Visit:  10/2/2018           Disclosure     Insurance plans vary and the physician(s) referred by the ER may not be covered by your plan.  Please contact you physician (or your personal doctor if your instructions are to return to your personal doctor) about any new or lasting problems. The primary care or specialist physician will see patients referred from the [facility] Emergency Department.  Follow-up c

## (undated) NOTE — Clinical Note
May 17, 2017    [facility] [address]. [id number]      Dear Abdoulaye Montez:     The results from your recent tests ordered by Alisa Warner are in,    Blood tests show no inflammation, given no rheumatologic disease, no r

## (undated) NOTE — LETTER
Mary Doyle 182 6 13[facility]  [place], [address] [place] [address]    Consent for Operation  Date: __________________                                Time: _______________    1.  I authorize the performance upon Stefan Goldmann the following operation:  Procedure videotaped, the surgeon will obtain the original videotape. The hospital will not be responsible for storage or maintenance of this tape. 7. For the purpose of advancing medical education, I consent to the admittance of observers to the Operating Room.   8 IN.    Signature of Patient:   ___________________________    When the patient is a minor or mentally incompetent to give consent:  Signature of person authorized to consent for patient: ___________________________   Relationship to patient: ______________ anesthesiologist about these medicines may increase my risk of anesthetic complications. iv. If I am allergic to anything or have had a reaction to anesthesia before. 3. I understand how the anesthesia medicine will help me (benefits).   4. I understand t their questions. The patient or their representative has agreed to have anesthesia services.     _____________________________________________________________________________  Witness        Date   Time  I have verified that the signature is that of the racquel

## (undated) NOTE — IP AVS SNAPSHOT
[facility] [place]  [address] [place], [address] ~ [phone]                Discharge Summary   2/20/2017    [facility]           Admission Information        Provider Department    2/20/2017 Luis Manuel Neri MD  Endoscopy Commonly known as:  ZOFRAN-ODT        LET 1 TABLET DISSOLVE BY MOUTH EVERY 8 HOURS AS NEEDED    Marineie      [    ]    [    ]    [    ]    [    ]       Pantoprazole Sodium 40 MG Tbec   Commonly known as:  PROTONIX        TAKE ONE TABLET BY MOUT Specialties:  GASTROENTEROLOGY, GASTROENTEROLOGY, Geriatrics, GASTROENTEROLOGY, GASTROENTEROLOGY    Contact information:    [address] [facility]        Future Appointments        Provider Department    8/ harming yourself, contact [facility] at [phone]. - If you don’t have insurance, Evita Dodd has partnered with [facility] [address] to help you get signed up for insurance coverage.   [facility] [place]

## (undated) NOTE — ED AVS SNAPSHOT
Андрей Zhongstevenmalcom   MRN: [id number]    Department:  [facility] Emergency Department   Date of Visit:  9/26/2018           Disclosure     Insurance plans vary and the physician(s) referred by the ER may not be covered by your plan.  Please contact you physician (or your personal doctor if your instructions are to return to your personal doctor) about any new or lasting problems. The primary care or specialist physician will see patients referred from the [facility] Emergency Department.  Follow-up c

## (undated) NOTE — LETTER
Mary Doyle 182 6 13[facility]  [place], [address] [place] [address]    Consent for Operation  Date: __________________                                Time: _______________    1.  I authorize the performance upon Alexandria Lauren the following operation:  Procedure obtain the original videotape. [facility] will not be responsible for storage or maintenance of this tape. 7. For the purpose of advancing medical education, I consent to the admittance of observers to the Operating Room.   8. I authorize the use of any ___________________________    When the patient is a minor or mentally incompetent to give consent:  Signature of person authorized to consent for patient: ___________________________   Relationship to patient: _____________________________________________ medicines may increase my risk of anesthetic complications. iv. If I am allergic to anything or have had a reaction to anesthesia before. 3. I understand how the anesthesia medicine will help me (benefits).   4. I understand that with any type of anesthes or their representative has agreed to have anesthesia services.     _____________________________________________________________________________  Witness        Date   Time  I have verified that the signature is that of the patient or patient’s representat

## (undated) NOTE — LETTER
Mary Doyle 182 6 13[facility]  [place], [address] [facility]    Consent for Operation  Date: __________________                                Time: _______________    1.  I authorize the performance upon Siva Foy the following operation:  Procedure them. If the procedure has been videotaped, the surgeon will obtain the original videotape. The hospital will not be responsible for storage or maintenance of this tape.   8. For the purpose of advancing medical education, I consent to the admittance of obs COMPLETION WERE FILLED IN.     Signature of Patient:   ___________________________    When the patient is a minor or mentally incompetent to give consent:  Signature of person authorized to consent for patient: ___________________________   Relationship to Failure to inform my anesthesiologist about these medicines may increase my risk of anesthetic complications. iv. If I am allergic to anything or have had a reaction to anesthesia before.   3. I understand how the anesthesia medicine will help me (benefits representative) and answered their questions. The patient or their representative has agreed to have anesthesia services.     _____________________________________________________________________________  Witness        Date   Time  I have verified that the

## (undated) NOTE — ED AVS SNAPSHOT
Katmariela Maurice   MRN: [id number]    Department:  [facility] Emergency Department   Date of Visit:  1/3/2019           Disclosure     Insurance plans vary and the physician(s) referred by the ER may not be covered by your plan.  Please contact your physician (or your personal doctor if your instructions are to return to your personal doctor) about any new or lasting problems. The primary care or specialist physician will see patients referred from the [facility] Emergency Department.  Follow-up c

## (undated) NOTE — ED AVS SNAPSHOT
Dennise Quincy   MRN: [id number]    Department:  [facility] Emergency Department   Date of Visit:  2/5/2019           Disclosure     Insurance plans vary and the physician(s) referred by the ER may not be covered by your plan.  Please contact your physician (or your personal doctor if your instructions are to return to your personal doctor) about any new or lasting problems. The primary care or specialist physician will see patients referred from the [facility] Emergency Department.  Follow-up c